# Patient Record
Sex: MALE | Race: WHITE | Employment: FULL TIME | URBAN - METROPOLITAN AREA
[De-identification: names, ages, dates, MRNs, and addresses within clinical notes are randomized per-mention and may not be internally consistent; named-entity substitution may affect disease eponyms.]

---

## 2017-05-01 ENCOUNTER — OFFICE VISIT (OUTPATIENT)
Dept: ORTHOPEDIC SURGERY | Age: 57
End: 2017-05-01

## 2017-05-01 VITALS
BODY MASS INDEX: 30.09 KG/M2 | HEART RATE: 68 BPM | HEIGHT: 73 IN | SYSTOLIC BLOOD PRESSURE: 141 MMHG | WEIGHT: 227 LBS | DIASTOLIC BLOOD PRESSURE: 85 MMHG | OXYGEN SATURATION: 99 % | RESPIRATION RATE: 20 BRPM | TEMPERATURE: 98.1 F

## 2017-05-01 DIAGNOSIS — M47.816 LUMBAR FACET ARTHROPATHY: Primary | Chronic | ICD-10-CM

## 2017-05-01 RX ORDER — TRIAMCINOLONE ACETONIDE 1 MG/G
CREAM TOPICAL
COMMUNITY
Start: 2017-04-17 | End: 2017-05-01

## 2017-05-01 RX ORDER — HYDROCODONE BITARTRATE AND ACETAMINOPHEN 5; 325 MG/1; MG/1
TABLET ORAL
Refills: 0 | COMMUNITY
Start: 2017-01-24 | End: 2017-05-01

## 2017-05-01 RX ORDER — LEVOTHYROXINE SODIUM 125 UG/1
TABLET ORAL
COMMUNITY
Start: 2017-04-24 | End: 2017-05-01

## 2017-05-01 RX ORDER — FLUTICASONE PROPIONATE 50 MCG
SPRAY, SUSPENSION (ML) NASAL
COMMUNITY
Start: 2017-04-17 | End: 2017-05-01

## 2017-05-01 NOTE — PATIENT INSTRUCTIONS
Low Back Arthritis: Exercises  Your Care Instructions  Here are some examples of typical rehabilitation exercises for your condition. Start each exercise slowly. Ease off the exercise if you start to have pain. Your doctor or physical therapist will tell you when you can start these exercises and which ones will work best for you. When you are not being active, find a comfortable position for rest. Some people are comfortable on the floor or a medium-firm bed with a small pillow under their head and another under their knees. Some people prefer to lie on their side with a pillow between their knees. Don't stay in one position for too long. Take short walks (10 to 20 minutes) every 2 to 3 hours. Avoid slopes, hills, and stairs until you feel better. Walk only distances you can manage without pain, especially leg pain. How to do the exercises  Pelvic tilt    1. Lie on your back with your knees bent. 2. \"Brace\" your stomach--tighten your muscles by pulling in and imagining your belly button moving toward your spine. 3. Press your lower back into the floor. You should feel your hips and pelvis rock back. 4. Hold for 6 seconds while breathing smoothly. 5. Relax and allow your pelvis and hips to rock forward. 6. Repeat 8 to 12 times. Back stretches    1. Get down on your hands and knees on the floor. 2. Relax your head and allow it to droop. Round your back up toward the ceiling until you feel a nice stretch in your upper, middle, and lower back. Hold this stretch for as long as it feels comfortable, or about 15 to 30 seconds. 3. Return to the starting position with a flat back while you are on your hands and knees. 4. Let your back sway by pressing your stomach toward the floor. Lift your buttocks toward the ceiling. 5. Hold this position for 15 to 30 seconds. 6. Repeat 2 to 4 times. Follow-up care is a key part of your treatment and safety.  Be sure to make and go to all appointments, and call your doctor if you are having problems. It's also a good idea to know your test results and keep a list of the medicines you take. Where can you learn more? Go to http://sarah-tomy.info/. Enter O949 in the search box to learn more about \"Low Back Arthritis: Exercises. \"  Current as of: May 23, 2016  Content Version: 11.2  © 8773-7136 [employer]. Care instructions adapted under license by [employer] (which disclaims liability or warranty for this information). If you have questions about a medical condition or this instruction, always ask your healthcare professional. [address] any warranty or liability for your use of this information.

## 2017-05-01 NOTE — LETTER
[place] [facility] Request Form for [facility] SALOME Gale Fax to [phone]  Telephone: [phone] To be completed by Physician Office: 
 
Date: 2017    Requested by: Lakshmi Alvarado Phone No: [phone]   Fax No:  [phone] Surgery Date: 17   Requested Time: 2:00 Provider: Dr. Candi Gong CPT CODE*  Procedure 46670.81 Facet Shaka L5/S1 *CPT code is required for ALL procedures. Patient Information: 
 
Name: Taylor Aguirre SSN: [id number]  : 1960   Male/Female: male Home Phone No: [phone] (home) Primary Insurance: [employer] Number: [id number] ICD10 code(s): m12.88 ICD9 code:    Diagnosis:  Lumbar Facet Arthropathy Diabetic/finger stick to be done BS level must be <200 mg/dl Anesthesia Type Local: Valium 10 mg PO 30 minutes prior to procedure 

## 2017-05-01 NOTE — PROGRESS NOTES
[facility]anda Solomon [facility] [address], [address]  [facility], [address]  Phone: [phone]  Fax: [phone]        Princess Anand  : 1960  PCP: Ginny Lerma MD    PROGRESS NOTE      ASSESSMENT AND PLAN    Roselia Perez was seen today for back pain. Diagnoses and all orders for this visit:    Lumbar facet arthropathy ([facility] [address]  -     SCHEDULE SURGERY    1. Set up for bilateral facet joint injections at L5-S1  2. Discussed RFA as possible treatment option. 3. Given home exercises. Follow-up Disposition:  Return if symptoms worsen or fail to improve. HISTORY OF PRESENT ILLNESS  Taylor Aguirre is a 60 y.o. male. Pt presents to the office for a 6 month f/u visit for back pain, lumbar facet arthropathy. He notes that during the winter he was doing well with his back pain. He notes that he did not have much pain last month when he had his initial appointment but notes that with the warm weather he has been doing more which has increased his back pain. His spouse, who is a nurse, makes sure he does his back exercises and stretches daily. He denies any trouble with prolonged standing. His pain bothers him in the morning and after prolonged activity. His back pain is most severe the day after increased activity. Stays active, plays golf. His back pain is mainly felt in his low back and occasionally into his buttocks. He describes his pain as a soreness. Pt notes that his back is stiff during the morning which will ease up as the day goes by. He denies any weakness in his BLE. No radiating pain in the office today. No saddle paresthesia. He takes PRN Mobic. Pt gets Clayton from his PCP but notes that his dose has been cut back. Denies persistent fevers, chills, weight changes, neurogenic bowel or bladder symptoms. Pt denies recent ED visits or hospitalizations. Pt planning a trip to [place] in early . Hoping to have an injection before his trip.  Believes his trip is set for June 17th. Last facet injections 2 years ago w/good benefit. Healthy otherwise. If he wants us to resume his NOrco, then would need appts at least [id number]. Pain Assessment  8/17/2016   Location of Pain Back   Location Modifiers -   Severity of Pain 4   Quality of Pain Sharp;Dull;Aching   Duration of Pain Persistent   Frequency of Pain Constant   Aggravating Factors (No Data)   Aggravating Factors Comment activity, beach house, golf   Limiting Behavior Yes   Relieving Factors Rest;Ice;Heat   Result of Injury No       PAST MEDICAL HISTORY   Past Medical History:   Diagnosis Date    Arthritis     Discogenic low back pain     L5-S1    Lumbar facet arthropathy     L4-L5    Thyroid disease        Past Surgical History:   Procedure Laterality Date    HX HEENT      deviated septum    HX ORTHOPAEDIC      rotator cuff bilateral   .      MEDICATIONS    Current Outpatient Prescriptions   Medication Sig Dispense Refill    fluticasone (FLONASE) 50 mcg/actuation nasal spray       HYDROcodone-acetaminophen (NORCO) 5-325 mg per tablet TAKE ONE TABLET BY MOUTH ONCE NIGHTLY AT BEDTIME USE SPARINGLY  0    SYNTHROID 125 mcg tablet       triamcinolone acetonide (KENALOG) 0.1 % topical cream       HYDROcodone-acetaminophen (NORCO) 7.5-325 mg per tablet Take 1 Tab by mouth two (2) times daily as needed for Pain. Max Daily Amount: 2 Tabs. 60 Tab 0    ibuprofen (MOTRIN) 600 mg tablet Take  by mouth every six (6) hours as needed for Pain.  LEVOTHYROXINE SODIUM (SYNTHROID PO) Take  by mouth.  cholecalciferol (VITAMIN D3) 1,000 unit tablet Take  by mouth daily. ALLERGIES  Allergies   Allergen Reactions    Pcn [Penicillins] Unknown (comments)     Told as a child severe allergy            SOCIAL HISTORY    Social History     Social History    Marital status: SINGLE     Spouse name: N/A    Number of children: N/A    Years of education: N/A     Occupational History    Not on file.      Social History Main Topics    Smoking status: Never Smoker    Smokeless tobacco: Former User    Alcohol use Yes      Comment: Occasional beer consumption    Drug use: Not on file    Sexual activity: Not on file     Other Topics Concern    Not on file     Social History Narrative       FAMILY HISTORY  Family History   Problem Relation Age of Onset    Diabetes Sister        REVIEW OF SYSTEMS  Review of Systems   Constitutional: Negative for chills, fever and weight loss. Respiratory: Negative for shortness of breath. Cardiovascular: Negative for chest pain. Gastrointestinal: Negative for constipation. Negative for fecal incontinence   Genitourinary: Negative for dysuria. Negative for urinary incontinence   Musculoskeletal:        Per HPI   Skin: Negative for rash. Neurological: Negative for dizziness, tingling, tremors, focal weakness and headaches. Endo/Heme/Allergies: Does not bruise/bleed easily. Psychiatric/Behavioral: The patient does not have insomnia. PHYSICAL EXAMINATION  Visit Vitals    /85    Pulse 68    Temp 98.1 °F (36.7 °C) (Oral)    Resp 20    Ht 6' 1\" (1.854 m)    Wt 227 lb (103 kg)    SpO2 99%    BMI 29.95 kg/m2         Accompanied by self. Constitutional:  Well developed, well nourished, in no acute distress. Psychiatric: Affect and mood are appropriate. Integumentary: No rashes or abrasions noted on exposed areas. Cardiovascular/Peripheral Vascular: Intact l pulses. No peripheral edema is noted. Lymphatic:  No evidence of lymphedema. No cervical lymphadenopathy. SPINE/MUSCULOSKELETAL EXAM    Lumbar spine:  No rash, ecchymosis, or gross obliquity. No fasciculations. No focal atrophy is noted. Lumbar pain with extension. Tenderness to palpation of L4-5. No tenderness to palpation at the sciatic notch. SI joints non-tender. Trochanters non tender. Sensation grossly intact to light touch.       MOTOR:       Hip Flex  Quads Hamstrings Ankle DF EHL Ankle PF   Right +4/5 +4/5 +4/5 +4/5 +4/5 +4/5   Left +4/5 +4/5 +4/5 +4/5 +4/5 +4/5     Straight Leg raise negative. No difficulty with tandem gait. Heel walk intact. Ambulation without assistive device. FWB. No hyper reflexia. Written by Ganesh Collins, as dictated by Janene Primrose, MD.    I, Dr. Janene Primrose, MD, confirm that all documentation is accurate. Mr. Aidan Ferguson may have a reminder for a \"due or due soon\" health maintenance. I have asked that he contact his primary care provider for follow-up on this health maintenance.

## 2017-05-09 ENCOUNTER — APPOINTMENT (OUTPATIENT)
Dept: GENERAL RADIOLOGY | Age: 57
End: 2017-05-09
Attending: PHYSICAL MEDICINE & REHABILITATION
Payer: COMMERCIAL

## 2017-05-09 ENCOUNTER — HOSPITAL ENCOUNTER (OUTPATIENT)
Age: 57
Setting detail: OUTPATIENT SURGERY
Discharge: HOME | End: 2017-05-09
Attending: PHYSICAL MEDICINE & REHABILITATION | Admitting: PHYSICAL MEDICINE & REHABILITATION
Payer: COMMERCIAL

## 2017-05-09 VITALS
SYSTOLIC BLOOD PRESSURE: 126 MMHG | HEART RATE: 58 BPM | OXYGEN SATURATION: 99 % | DIASTOLIC BLOOD PRESSURE: 79 MMHG | RESPIRATION RATE: 18 BRPM | BODY MASS INDEX: 29.69 KG/M2 | TEMPERATURE: 98.2 F | WEIGHT: 225 LBS

## 2017-05-09 PROCEDURE — 74011250636 HC RX REV CODE- 250/636

## 2017-05-09 PROCEDURE — 74011000250 HC RX REV CODE- 250

## 2017-05-09 PROCEDURE — 74011636320 HC RX REV CODE- 636/320

## 2017-05-09 PROCEDURE — 76010000009 HC PAIN MGT 0 TO 30 MIN PROC: Performed by: PHYSICAL MEDICINE & REHABILITATION

## 2017-05-09 PROCEDURE — 74011250637 HC RX REV CODE- 250/637: Performed by: PHYSICAL MEDICINE & REHABILITATION

## 2017-05-09 RX ORDER — LIDOCAINE HYDROCHLORIDE 10 MG/ML
INJECTION, SOLUTION EPIDURAL; INFILTRATION; INTRACAUDAL; PERINEURAL AS NEEDED
Status: DISCONTINUED | OUTPATIENT
Start: 2017-05-09 | End: 2017-05-09 | Stop reason: HOSPADM

## 2017-05-09 RX ORDER — DEXAMETHASONE SODIUM PHOSPHATE 100 MG/10ML
INJECTION INTRAMUSCULAR; INTRAVENOUS AS NEEDED
Status: DISCONTINUED | OUTPATIENT
Start: 2017-05-09 | End: 2017-05-09 | Stop reason: HOSPADM

## 2017-05-09 RX ORDER — DIAZEPAM 5 MG/1
2.5-1 TABLET ORAL ONCE
Status: COMPLETED | OUTPATIENT
Start: 2017-05-09 | End: 2017-05-09

## 2017-05-09 RX ADMIN — DIAZEPAM 10 MG: 5 TABLET ORAL at 13:11

## 2017-05-09 NOTE — H&P (VIEW-ONLY)
[facility] [address], [address]  [place], [address] [place] [address] Street  Phone: [phone]  Fax: [phone]        Claritza Ferro  : 1960  PCP: Kaylene Goodrich MD    PROGRESS NOTE      ASSESSMENT AND PLAN    Manan Morgan was seen today for back pain. Diagnoses and all orders for this visit:    Lumbar facet arthropathy ([facility] [address]  -     SCHEDULE SURGERY    1. Set up for bilateral facet joint injections at L5-S1  2. Discussed RFA as possible treatment option. 3. Given home exercises. Follow-up Disposition:  Return if symptoms worsen or fail to improve. HISTORY OF PRESENT ILLNESS  Clearance Kymberly is a 60 y.o. male. Pt presents to the office for a 6 month f/u visit for back pain, lumbar facet arthropathy. He notes that during the winter he was doing well with his back pain. He notes that he did not have much pain last month when he had his initial appointment but notes that with the warm weather he has been doing more which has increased his back pain. His spouse, who is a nurse, makes sure he does his back exercises and stretches daily. He denies any trouble with prolonged standing. His pain bothers him in the morning and after prolonged activity. His back pain is most severe the day after increased activity. Stays active, plays golf. His back pain is mainly felt in his low back and occasionally into his buttocks. He describes his pain as a soreness. Pt notes that his back is stiff during the morning which will ease up as the day goes by. He denies any weakness in his BLE. No radiating pain in the office today. No saddle paresthesia. He takes PRN Mobic. Pt gets Carolina from his PCP but notes that his dose has been cut back. Denies persistent fevers, chills, weight changes, neurogenic bowel or bladder symptoms. Pt denies recent ED visits or hospitalizations. Pt planning a trip to [place] in early . Hoping to have an injection before his trip.  Believes his trip is set for June 17th. Last facet injections 2 years ago w/good benefit. Healthy otherwise. If he wants us to resume his NOrco, then would need appts at least [id number]. Pain Assessment  8/17/2016   Location of Pain Back   Location Modifiers -   Severity of Pain 4   Quality of Pain Sharp;Dull;Aching   Duration of Pain Persistent   Frequency of Pain Constant   Aggravating Factors (No Data)   Aggravating Factors Comment activity, beach house, golf   Limiting Behavior Yes   Relieving Factors Rest;Ice;Heat   Result of Injury No       PAST MEDICAL HISTORY   Past Medical History:   Diagnosis Date    Arthritis     Discogenic low back pain     L5-S1    Lumbar facet arthropathy     L4-L5    Thyroid disease        Past Surgical History:   Procedure Laterality Date    HX HEENT      deviated septum    HX ORTHOPAEDIC      rotator cuff bilateral   .      MEDICATIONS    Current Outpatient Prescriptions   Medication Sig Dispense Refill    fluticasone (FLONASE) 50 mcg/actuation nasal spray       HYDROcodone-acetaminophen (NORCO) 5-325 mg per tablet TAKE ONE TABLET BY MOUTH ONCE NIGHTLY AT BEDTIME USE SPARINGLY  0    SYNTHROID 125 mcg tablet       triamcinolone acetonide (KENALOG) 0.1 % topical cream       HYDROcodone-acetaminophen (NORCO) 7.5-325 mg per tablet Take 1 Tab by mouth two (2) times daily as needed for Pain. Max Daily Amount: 2 Tabs. 60 Tab 0    ibuprofen (MOTRIN) 600 mg tablet Take  by mouth every six (6) hours as needed for Pain.  LEVOTHYROXINE SODIUM (SYNTHROID PO) Take  by mouth.  cholecalciferol (VITAMIN D3) 1,000 unit tablet Take  by mouth daily. ALLERGIES  Allergies   Allergen Reactions    Pcn [Penicillins] Unknown (comments)     Told as a child severe allergy            SOCIAL HISTORY    Social History     Social History    Marital status: SINGLE     Spouse name: N/A    Number of children: N/A    Years of education: N/A     Occupational History    Not on file.      Social History Main Topics    Smoking status: Never Smoker    Smokeless tobacco: Former User    Alcohol use Yes      Comment: Occasional beer consumption    Drug use: Not on file    Sexual activity: Not on file     Other Topics Concern    Not on file     Social History Narrative       FAMILY HISTORY  Family History   Problem Relation Age of Onset    Diabetes Sister        REVIEW OF SYSTEMS  Review of Systems   Constitutional: Negative for chills, fever and weight loss. Respiratory: Negative for shortness of breath. Cardiovascular: Negative for chest pain. Gastrointestinal: Negative for constipation. Negative for fecal incontinence   Genitourinary: Negative for dysuria. Negative for urinary incontinence   Musculoskeletal:        Per HPI   Skin: Negative for rash. Neurological: Negative for dizziness, tingling, tremors, focal weakness and headaches. Endo/Heme/Allergies: Does not bruise/bleed easily. Psychiatric/Behavioral: The patient does not have insomnia. PHYSICAL EXAMINATION  Visit Vitals    /85    Pulse 68    Temp 98.1 °F (36.7 °C) (Oral)    Resp 20    Ht 6' 1\" (1.854 m)    Wt 227 lb (103 kg)    SpO2 99%    BMI 29.95 kg/m2         Accompanied by self. Constitutional:  Well developed, well nourished, in no acute distress. Psychiatric: Affect and mood are appropriate. Integumentary: No rashes or abrasions noted on exposed areas. Cardiovascular/Peripheral Vascular: Intact l pulses. No peripheral edema is noted. Lymphatic:  No evidence of lymphedema. No cervical lymphadenopathy. SPINE/MUSCULOSKELETAL EXAM    Lumbar spine:  No rash, ecchymosis, or gross obliquity. No fasciculations. No focal atrophy is noted. Lumbar pain with extension. Tenderness to palpation of L4-5. No tenderness to palpation at the sciatic notch. SI joints non-tender. Trochanters non tender. Sensation grossly intact to light touch.       MOTOR:       Hip Flex  Quads Hamstrings Ankle DF EHL Ankle PF   Right +4/5 +4/5 +4/5 +4/5 +4/5 +4/5   Left +4/5 +4/5 +4/5 +4/5 +4/5 +4/5     Straight Leg raise negative. No difficulty with tandem gait. Heel walk intact. Ambulation without assistive device. FWB. No hyper reflexia. Written by Nicho Murcia, as dictated by Frandy Fermin MD.    I, Dr. Frandy Fermin MD, confirm that all documentation is accurate. Mr. Stephani Oreilly may have a reminder for a \"due or due soon\" health maintenance. I have asked that he contact his primary care provider for follow-up on this health maintenance.

## 2017-05-09 NOTE — DISCHARGE INSTRUCTIONS
[facility]   ([place])  Dr. Judeth Boas, Dr. Ally Molina, Dr. Barth Dear not drive a car, operate heavy machinery or dangerous equipment for 24 hours. * Activity as tolerated; rest for the remainder of the day. * Resume pre-block medications including those for your family doctor. * Do not drink alcoholic beverages for 24 hours. Alcohol and the medications you have received may interact and cause an adverse reaction. * You may feel better this evening and worse tomorrow, as the numbing medications wears off and the steroid has yet to begin to work. After 48 hrs the steroid should begin to release bringing you relief. * You may shower this evening and remove any bandages. * Avoid hot tubs and heating pads for 24 hours. You may use cold packs on the procedure site as tolerated for the first 24 hours. * If a headache develops, drink plenty of fluids and rest.  Take over the counter medications for headache if needed. If the headache continues longer than 24 hours, call MD at the [employer] [phone]    * Continue taking pain medications as needed. * You may resume your regular diet if tolerated. Otherwise, start with sips of water and advance slowly. * If Diabetic: check your blood sugar three times a day for the next 3 days. If your sugar is greater than 300 call your family doctor. If your sugar is greater than 400, have someone transport you to the nearest Emergency Room. * If you experience any of the following problems, Please Call the [employer] at [phone].         * Shortness of Breath    * Fever of 101 or higher    * Nausea / Vomiting    * Severe Headache    * Weakness or numbness in arms or legs that is not      resolving    * Prolonged increase in pain greater than 4 days      DISCHARGE SUMMARY from Nurse      PATIENT INSTRUCTIONS:    After oral sedation, for 24 hours or while taking prescription Narcotics:  · Limit your activities  · Do not drive and operate hazardous machinery  · Do not make important personal or business decisions  · Do  not drink alcoholic beverages  · If you have not urinated within 8 hours after discharge, please contact your surgeon on call. Report the following to your surgeon:  · Excessive pain, swelling, redness or odor of or around the surgical area  · Temperature over 101  · Nausea and vomiting lasting longer than 4 hours or if unable to take medications  · Any signs of decreased circulation or nerve impairment to extremity: change in color, persistent  numbness, tingling, coldness or increase pain  · Any questions            What to do at Home:  Recommended activity: Activity as tolerated, NO DRIVING FOR 12 Hours post injection          *  Please give a list of your current medications to your Primary Care Provider. *  Please update this list whenever your medications are discontinued, doses are      changed, or new medications (including over-the-counter products) are added. *  Please carry medication information at all times in case of emergency situations. These are general instructions for a healthy lifestyle:    No smoking/ No tobacco products/ Avoid exposure to second hand smoke    Surgeon General's Warning:  Quitting smoking now greatly reduces serious risk to your health. Obesity, smoking, and sedentary lifestyle greatly increases your risk for illness    A healthy diet, regular physical exercise & weight monitoring are important for maintaining a healthy lifestyle    You may be retaining fluid if you have a history of heart failure or if you experience any of the following symptoms:  Weight gain of 3 pounds or more overnight or 5 pounds in a week, increased swelling in our hands or feet or shortness of breath while lying flat in bed.   Please call your doctor as soon as you notice any of these symptoms; do not wait until your next office visit. Recognize signs and symptoms of STROKE:    F-face looks uneven    A-arms unable to move or move unevenly    S-speech slurred or non-existent    T-time-call 911 as soon as signs and symptoms begin-DO NOT go       Back to bed or wait to see if you get better-TIME IS BRAIN.

## 2017-05-09 NOTE — PROCEDURES
Facet Joint Block Procedure Note    Patient Name: Rosy Rossi    Date of Procedure: May 9, 2017    Preoperative Diagnosis: Arthropathy of lumbar facet joint [facility]) [M12.88]    Post Operative Diagnosis:[facility]thropathy [facility] lumbar facet joint [facility] [M12.88]     Procedure:  bilateral  L5-S1 Facet Joint Block        Consent: Informed consent was obtained prior to the procedure. The patient was given the opportunity to ask questions regarding the procedure and its associated risks. In addition to the potential risks associated with the procedure itself, the patient was informed both verbally and in writing of potential side effects of the use of glucocorticoids. The patient appeared to comprehend the informed consent and desired to have the procedure performed. Procedure: The patient was placed in the prone position on the fluoroscopy table and the back was prepped and draped in the usual sterile manner. At each blocked level, the exact location of the facet joint was identified with flouroscopy, and after local Lidocaine 1% injection, a #22 gauge spinal needle was then advanced toward the joint. Yes a small amount of Isovue was used to confirm placement, no vascular uptake was identified. A total of 30 mg of preservative free Dexamethasone and 5 cc of 1% Lidocaine was injected in and around all of the sites. The patient tolerated the procedure well. The injection area was cleaned and bandaids applied. No excessive bleeding was noted. Patient dressed and was discharged to home with instructions.        Juan Easley MD  May 9, 2017

## 2017-05-09 NOTE — INTERVAL H&P NOTE
H&P Update:  Eduar Moreau was seen and briefly examined. History and physical has been reviewed.  There have been no significant clinical changes since the completion of the originally dated History and Physical.    Signed By: Brian Green MD     May 9, 2017 1:53 PM

## 2018-11-26 ENCOUNTER — HOSPITAL ENCOUNTER (OUTPATIENT)
Dept: GENERAL RADIOLOGY | Age: 58
Discharge: HOME | End: 2018-11-26
Payer: COMMERCIAL

## 2018-11-26 DIAGNOSIS — R55 SYNCOPAL EPISODES: ICD-10-CM

## 2018-11-26 PROCEDURE — 71046 X-RAY EXAM CHEST 2 VIEWS: CPT

## 2021-11-29 ENCOUNTER — OFFICE VISIT (OUTPATIENT)
Dept: ORTHOPEDIC SURGERY | Age: 61
End: 2021-11-29
Payer: COMMERCIAL

## 2021-11-29 VITALS
HEART RATE: 69 BPM | OXYGEN SATURATION: 98 % | TEMPERATURE: 97.6 F | HEIGHT: 73 IN | BODY MASS INDEX: 29.29 KG/M2 | WEIGHT: 221 LBS

## 2021-11-29 DIAGNOSIS — D17.79 EPIDURAL LIPOMATOSIS: ICD-10-CM

## 2021-11-29 DIAGNOSIS — M54.50 CHRONIC LOW BACK PAIN, UNSPECIFIED BACK PAIN LATERALITY, UNSPECIFIED WHETHER SCIATICA PRESENT: ICD-10-CM

## 2021-11-29 DIAGNOSIS — G89.29 CHRONIC LOW BACK PAIN, UNSPECIFIED BACK PAIN LATERALITY, UNSPECIFIED WHETHER SCIATICA PRESENT: ICD-10-CM

## 2021-11-29 DIAGNOSIS — M47.816 LUMBAR SPONDYLOSIS: Primary | ICD-10-CM

## 2021-11-29 PROCEDURE — 72110 X-RAY EXAM L-2 SPINE 4/>VWS: CPT | Performed by: PHYSICAL MEDICINE & REHABILITATION

## 2021-11-29 PROCEDURE — 99204 OFFICE O/P NEW MOD 45 MIN: CPT | Performed by: PHYSICAL MEDICINE & REHABILITATION

## 2021-11-29 RX ORDER — DULOXETIN HYDROCHLORIDE 20 MG/1
20 CAPSULE, DELAYED RELEASE ORAL DAILY
Qty: 90 CAPSULE | Refills: 0 | Status: SHIPPED | OUTPATIENT
Start: 2021-11-29 | End: 2022-02-02 | Stop reason: SINTOL

## 2021-11-29 RX ORDER — TRIAMCINOLONE ACETONIDE 1 MG/G
CREAM TOPICAL
COMMUNITY
Start: 2021-09-20

## 2021-11-29 RX ORDER — FLUTICASONE PROPIONATE 50 MCG
2 SPRAY, SUSPENSION (ML) NASAL
COMMUNITY
Start: 2021-09-18

## 2021-11-29 NOTE — PROGRESS NOTES
[address], [address]  [place], [facility]  Phone: [phone]  Fax: [phone]        Ritu Piedra  : 1960  PCP: Negra Lund MD    NEW PATIENT EVALUATION      ASSESSMENT AND PLAN    Diagnoses and all orders for this visit:    1. Lumbar spondylosis  -     DULoxetine (CYMBALTA) 20 mg capsule; Take 1 Capsule by mouth daily. Take w/food    2. Chronic low back pain, unspecified back pain laterality, unspecified whether sciatica present  -     AMB POC XRAY, SPINE, LUMBOSACRAL; 4+    3. Epidural lipomatosis         1. Kori العراقي is a 59 y.o. male with chronic axial greater than stenotic symptoms. Discussed options  2. Risks, benefits, alternatives, and limitations of RFA discussed with patient. 3. Trial of Cymbalta 20 mg daily with food  4. Patient may take up to 2g (2,000 mg) of Tylenol every 24 hours for routine use, or 3g (3,000 mg) for short-term use. 5. Given information on lumbar exercises      Follow-up and Dispositions    · Return in about 6 weeks (around 1/10/2022). HISTORY OF PRESENT ILLNESS  Kori العراقي is seen today in consultation for back pain x years. Last seen . Had facet joint injections at that time with benefit. He returns today at the request of Dr. Aaliyah Rojas, PCP. She is trying to wean his Norco.  Renal dysfunction noted, he has been taken off of NSAIDs with normalization of renal function. He reports a constant back pain that is worse in the morning. Denies sciatic pain. His pain is exacerbated with bending and twisting. Denies numbness or tingling. Denies insomnia. If he overdoes it, he feels pain in his buttocks. He is taking a half tab of Hydrocodone 2-3 times a week and Ibuprofen PRN with benefit. He states that he limits the amount of Ibuprofen he uses. Denies red flags including persistent fevers, chills, weight changes, neurogenic bowel or bladder symptoms.       Pain Assessment  11/29/2021   Location of Pain Back   Location Modifiers -   Severity of Pain 3   Quality of Pain Aching   Duration of Pain Persistent   Frequency of Pain Constant   Aggravating Factors Other (Comment)   Aggravating Factors Comment move a certain way/different positions, getting up in the morning, rake leaves, mow lawn   Limiting Behavior Yes   Relieving Factors Ice   Result of Injury No       Onset of pain: years, no injury    Does patient have numbness/tingling in extremities: no    Denies persistent fevers, chills, weight changes, saddle paresthesias, and neurogenic bowel or bladder symptoms. 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 PHYSICAL EXAM  Lumbar flexion fingertips to knees, pain with extension  TTP L4-5  Intact tandem gait  Lower extremity strength intact  Straight leg raise negative    Visit Vitals  Pulse 69   Temp 97.6 °F (36.4 °C) (Tympanic)   Ht 6' 1\" (1.854 m)   Wt 221 lb (100.2 kg)   SpO2 98% Comment: RA   BMI 29.16 kg/m²         Past Medical History:   Diagnosis Date    Arthritis     Discogenic low back pain     L5-S1    Lumbar facet arthropathy     L4-L5    Thyroid disease         Past Surgical History:   Procedure Laterality Date    HX HEENT      deviated septum    HX ORTHOPAEDIC      rotator cuff bilateral         Current Outpatient Medications   Medication Sig Dispense Refill    fluticasone propionate (FLONASE) 50 mcg/actuation nasal spray 2 Sprays by Both Nostrils route daily as needed.       triamcinolone acetonide (KENALOG) 0.1 % topical cream Apply  to affected area daily as needed.  DULoxetine (CYMBALTA) 20 mg capsule Take 1 Capsule by mouth daily. Take w/food 90 Capsule 0    LEVOTHYROXINE SODIUM (SYNTHROID PO) Take 25 mcg by mouth daily.  cholecalciferol (VITAMIN D3) 1,000 unit tablet Take  by mouth daily.  HYDROcodone-acetaminophen (NORCO) 7.5-325 mg per tablet Take 1 Tab by mouth two (2) times daily as needed for Pain. Max Daily Amount: 2 Tabs.  60 Tab 0

## 2021-11-29 NOTE — PROGRESS NOTES
Gunner Molina presents today for   Chief Complaint   Patient presents with    Back Pain       Is someone accompanying this pt? no    Is the patient using any DME equipment during OV? no      Coordination of Care:  1. Have you been to the ER, urgent care clinic since your last visit? no  Hospitalized since your last visit? no    2. Have you seen or consulted any other health care providers outside of the [address] [place] [address] since your last visit? Yes, pcp Include any pap smears or colon screening.  no

## 2021-11-29 NOTE — LETTER
11/29/2021    Patient: Mushtaq Sneed   YOB: 1960   Date of Visit: 11/29/2021     Boo Pacheco MD  [address] [address]  Via Fax: [phone]    Dear Boo Pacheco MD,      Thank you for referring Mr. Shani Cardoso to [facility] for evaluation. My notes for this consultation are attached. If you have questions, please do not hesitate to call me. I look forward to following your patient along with you.       Sincerely,    Jim Desai MD

## 2021-11-29 NOTE — PATIENT INSTRUCTIONS

## 2022-02-02 ENCOUNTER — OFFICE VISIT (OUTPATIENT)
Dept: ORTHOPEDIC SURGERY | Age: 62
End: 2022-02-02
Payer: COMMERCIAL

## 2022-02-02 VITALS
HEART RATE: 76 BPM | WEIGHT: 223 LBS | HEIGHT: 73 IN | OXYGEN SATURATION: 98 % | TEMPERATURE: 96.8 F | SYSTOLIC BLOOD PRESSURE: 143 MMHG | BODY MASS INDEX: 29.55 KG/M2 | DIASTOLIC BLOOD PRESSURE: 82 MMHG

## 2022-02-02 DIAGNOSIS — M47.816 LUMBAR SPONDYLOSIS: ICD-10-CM

## 2022-02-02 DIAGNOSIS — M47.816 LUMBAR SPONDYLOSIS: Primary | ICD-10-CM

## 2022-02-02 PROCEDURE — 20552 NJX 1/MLT TRIGGER POINT 1/2: CPT | Performed by: PHYSICAL MEDICINE & REHABILITATION

## 2022-02-02 RX ORDER — ACETAMINOPHEN 325 MG/1
325 TABLET ORAL
COMMUNITY
End: 2022-05-12

## 2022-02-02 RX ORDER — LIDOCAINE HYDROCHLORIDE 20 MG/ML
0.5 INJECTION, SOLUTION INFILTRATION; PERINEURAL ONCE
Status: COMPLETED | OUTPATIENT
Start: 2022-02-02 | End: 2022-02-02

## 2022-02-02 RX ORDER — BETAMETHASONE SODIUM PHOSPHATE AND BETAMETHASONE ACETATE 3; 3 MG/ML; MG/ML
12 INJECTION, SUSPENSION INTRA-ARTICULAR; INTRALESIONAL; INTRAMUSCULAR; SOFT TISSUE ONCE
Status: COMPLETED | OUTPATIENT
Start: 2022-02-02 | End: 2022-02-02

## 2022-02-02 RX ORDER — BUPIVACAINE HYDROCHLORIDE 2.5 MG/ML
0.5 INJECTION, SOLUTION INFILTRATION; PERINEURAL ONCE
Status: COMPLETED | OUTPATIENT
Start: 2022-02-02 | End: 2022-02-02

## 2022-02-02 RX ADMIN — BUPIVACAINE HYDROCHLORIDE 1.25 MG: 2.5 INJECTION, SOLUTION INFILTRATION; PERINEURAL at 15:00

## 2022-02-02 RX ADMIN — BETAMETHASONE SODIUM PHOSPHATE AND BETAMETHASONE ACETATE 12 MG: 3; 3 INJECTION, SUSPENSION INTRA-ARTICULAR; INTRALESIONAL; INTRAMUSCULAR; SOFT TISSUE at 15:00

## 2022-02-02 RX ADMIN — LIDOCAINE HYDROCHLORIDE 10 MG: 20 INJECTION, SOLUTION INFILTRATION; PERINEURAL at 15:00

## 2022-02-02 NOTE — PROGRESS NOTES
Joycesaad Wells presents today for   Chief Complaint   Patient presents with    Back Pain       Is someone accompanying this pt? no    Is the patient using any DME equipment during OV? no    Coordination of Care:  1. Have you been to the ER, urgent care clinic since your last visit? no  Hospitalized since your last visit? no    2. Have you seen or consulted any other health care providers outside of the [address] [place] [address] since your last visit? no Include any pap smears or colon screening.  no

## 2022-02-02 NOTE — LETTER
2/2/2022    Patient: Letitia Mondragon   YOB: 1960   Date of Visit: 2/2/2022     Diane Johnson MD  [address] [address]  Via Fax: [phone]    Dear Diane Johnson MD,      Thank you for referring Mr. Tita Pacheco to [address] [place] [address] [facility] for evaluation. My notes for this consultation are attached. If you have questions, please do not hesitate to call me. I look forward to following your patient along with you.       Sincerely,    Roseanne David MD

## 2022-02-02 NOTE — PROGRESS NOTES
[address], [address]  [facility], [address]  Phone: [phone]  Fax: [phone]        [facility]  : 1960  PCP: Gurdeep Self MD    PROGRESS NOTE      ASSESSMENT AND PLAN    Diagnoses and all orders for this visit:    1. Lumbar spondylosis  -     INJECT TRIGGER POINT, 1 OR 2  -     lidocaine (XYLOCAINE) 20 mg/mL (2 %) injection 10 mg  -     bupivacaine HCl (MARCAINE) 0.25 % (2.5 mg/mL) injection 1.25 mg  -     betamethasone (CELESTONE) injection 12 mg  -     MRI LUMB SPINE WO CONT; Future        1. 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 PCP weaning him off of opioids. Intolerant of duloxetine. Discussed trials of other medications. He would like to have facet joint injections repeated. His last MRI was in , he needs a more recent study. 2. DC Cymbalta  3. Trigger point injections B/L iliolumbar as a temporizing measure. 4. L MRI for 6 months increasing low back pain radiating into buttocks, failed HEP, possible injections, unable to take NSAIDs          HISTORY OF PRESENT ILLNESS      Tati Coronado is a 59 y.o. male presents for follow up of back pain. LV, trial of Cymbalta 20 mg. He reports constant back pain that radiates into his buttocks. His pain is exacerbated with bending and twisting. Patient states that his symptoms are worse in the morning. Denies sciatica. Patient suffered from mood swings and constipation while taking Cymbalta. He stopped taking this medication.     Pain Assessment  2022   Location of Pain Back   Location Modifiers -   Severity of Pain 3   Quality of Pain Aching   Duration of Pain Persistent   Frequency of Pain Constant   Aggravating Factors Other (Comment)   Aggravating Factors Comment different positions, movements, worse in the morning   Limiting Behavior Some   Relieving Factors Other (Comment)   Relieving Factors Comment pain pills   Result of Injury No         Investigations:   L XR AP/lat/flex/ext 4V 11/29/2021 (I personally reviewed these images): facet changes L3 to sacrum, no instability  L MRI 2015: disc protrusion with FA L4/5/S1, mild stenosis  Spine surgery consult: no     Treatments:  Physical therapy: no  Spinal injections: B/L L5-S1 facet injections 2017 with benefit  Spinal surgery- no  Beneficial medications: Ibuprofen, Norco (PCP weaning)  Failed medications: Cymbalta - mood swings     Work Status: shipyard as a matt  Pertinent PMHx:  Thyroid disease, rotator cuff repair B/L. PHYSICAL EXAMINATION    Visit Vitals  BP (!) 143/82 (BP 1 Location: Left upper arm, BP Patient Position: Sitting, BP Cuff Size: Adult) Comment: pt asymptomatic, MD aware   Pulse 76   Temp 96.8 °F (36 °C) (Temporal)   Ht 6' 1\" (1.854 m)   Wt 223 lb (101.2 kg)   SpO2 98% Comment: RA   BMI 29.42 kg/m²       TTP B/L L4-5, L5-S1, B/L sciatic notch  Pain with facet loading  Intact tandem gait  Intact heel walk   LE strength intact  SLR negative      [facility]  OFFICE PROCEDURE PROGRESS NOTE      PROCEDURE: In the office today after informed consent using aseptic technique, the patient was injected with a total of 2 cc of 6 mg/cc Celestone, 0.5 cc each of Lidocaine and Marcaine into his B/L iliolumbar trigger point. Chart reviewed for the following:   Dr. Hitesh REID Client, have reviewed the History, Physical and updated the Allergic reactions for [facility]. Local measures (ice/heat) and medications have not alleviated the symptoms. TIME OUT performed immediately prior to start of procedure:   Dr. Hitesh REID Client, have performed the following reviews on [facility] prior to the start of the procedure:       Patient denies any recent fevers, chills, antibiotics, recent cortisone injections, or infections.         * Patient was identified by name and date of birth   * Agreement on procedure being performed was verified  * Risks and Benefits explained to the patient  * Procedure site verified and marked as necessary  * Patient was positioned for comfort  * Consent was signed and verified     Time: 3:03 PM    Date of procedure: 2/2/2022    Procedure performed by:  Yakelin Li MD    Provider assisted by: None    Patient assisted by: Self    How tolerated by patient: Pt tolerated the procedure well with no complications.                      Written by Sunday Line, as dictated by Robles Puri MD. 4

## 2022-02-16 ENCOUNTER — HOSPITAL ENCOUNTER (OUTPATIENT)
Age: 62
Discharge: HOME | End: 2022-02-16
Attending: PHYSICAL MEDICINE & REHABILITATION
Payer: COMMERCIAL

## 2022-02-16 PROCEDURE — 72148 MRI LUMBAR SPINE W/O DYE: CPT

## 2022-03-17 ENCOUNTER — OFFICE VISIT (OUTPATIENT)
Dept: ORTHOPEDIC SURGERY | Age: 62
End: 2022-03-17
Payer: COMMERCIAL

## 2022-03-17 VITALS
TEMPERATURE: 96.9 F | DIASTOLIC BLOOD PRESSURE: 80 MMHG | HEIGHT: 73 IN | BODY MASS INDEX: 29.55 KG/M2 | SYSTOLIC BLOOD PRESSURE: 139 MMHG | WEIGHT: 223 LBS | HEART RATE: 70 BPM | OXYGEN SATURATION: 98 %

## 2022-03-17 DIAGNOSIS — D17.79 EPIDURAL LIPOMATOSIS: Primary | ICD-10-CM

## 2022-03-17 DIAGNOSIS — M47.816 LUMBAR FACET ARTHROPATHY: ICD-10-CM

## 2022-03-17 PROCEDURE — 99214 OFFICE O/P EST MOD 30 MIN: CPT | Performed by: PHYSICAL MEDICINE & REHABILITATION

## 2022-03-17 RX ORDER — PREGABALIN 50 MG/1
50 CAPSULE ORAL 2 TIMES DAILY
Qty: 180 CAPSULE | Refills: 0 | Status: ON HOLD | OUTPATIENT
Start: 2022-03-17 | End: 2022-03-29

## 2022-03-17 NOTE — H&P (VIEW-ONLY)
Lawanda Solomon [facility] [address]  [address], [address]  [place], [address] [place] [address] Street  Phone: [phone]  Fax: [phone]        Keaton Piedra  : 1960  PCP: Severiano Deaner, MD    PROGRESS NOTE      ASSESSMENT AND PLAN    Diagnoses and all orders for this visit:    1. Epidural lipomatosis  -     pregabalin (Lyrica) 50 mg capsule; Take 1 Capsule by mouth two (2) times a day. Max Daily Amount: 100 mg. Month 1: one po qhs x 1 week, then increase to one po bid thereafter  -     SCHEDULE SURGERY    2. Lumbar facet arthropathy  -     REFERRAL TO PAIN MANAGEMENT  -     pregabalin (Lyrica) 50 mg capsule; Take 1 Capsule by mouth two (2) times a day. Max Daily Amount: 100 mg. Month 1: one po qhs x 1 week, then increase to one po bid thereafter        1. 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 2. Referral to pain management, pt was on chronic Norco through prior PCP who has retired. 3. Risks, benefits, alternatives, and limitations of CECE discussed with patient. Patient wishes to proceed. 4. Schedule CECE L5-S1  5. Trial of Lyrica 50 mg BID  6. Discussed indications for RFA and sx    Follow-up and Dispositions    · Return in about 5 weeks (around 2022) for after Injections. HISTORY OF PRESENT ILLNESS      Vicente Mills is a 59 y.o. male presents for follow up of back pain/MRI. He continues to have constant back pain. Radiates into buttocks. His pain is exacerbated with standing and bending. He notes an increase in pain after playing with his grandchildren. He states the trigger point injections last visit provided no significant benefit. New PCP will not write for any more [place] (unlike prior PCP who retired).     Pain Assessment  3/17/2022   Location of Pain Back   Location Modifiers -   Severity of Pain 3   Quality of Pain Aching   Duration of Pain Persistent   Frequency of Pain Constant   Aggravating Factors Other (Comment)   Aggravating Factors Comment activities   Limiting Behavior Yes   Relieving Factors Other (Comment)   Relieving Factors Comment a little bit of everything   Result of Injury No         Investigations:   L MRI 2/2022: progression of DDD, epidural lipomatosis. Mild stenosis L2/3/4/5, moderate FA with inflammation. My read: worst level stenosis L3-4  L XR AP/lat/flex/ext 4V 11/29/2021 (I personally reviewed these images): facet changes L3 to sacrum, no instability  L MRI 2015: disc protrusion with FA L4/5/S1, mild stenosis  Spine surgery consult: no     Treatments:  Physical therapy: no  Spinal injections: B/L L5-S1 facet injections 2017 with benefit  Spinal surgery- no  Beneficial medications: Ibuprofen, Norco (PCP weaning)  Failed medications: Cymbalta - mood swings     Work Status: shipyard as a matt  Pertinent PMHx:  Thyroid disease, rotator cuff repair B/L.        PHYSICAL EXAMINATION    Visit Vitals  /80 (BP 1 Location: Left upper arm, BP Patient Position: Sitting, BP Cuff Size: Adult)   Pulse 70   Temp 96.9 °F (36.1 °C) (Temporal)   Ht 6' 1\" (1.854 m)   Wt 223 lb (101.2 kg)   SpO2 98%   BMI 29.42 kg/m²       Lumbar flexion to knees.  Pain with facet loading  Intact tandem gait and heel walk  SLR negative  LE motor intact              Written by Faith Fan, as dictated by Lois Brumfield MD.

## 2022-03-17 NOTE — PROGRESS NOTES
Stalin Torre presents today for   Chief Complaint   Patient presents with    Back Pain       Is someone accompanying this pt? no    Is the patient using any DME equipment during OV? no      Coordination of Care:  1. Have you been to the ER, urgent care clinic since your last visit? no  Hospitalized since your last visit? no    2. Have you seen or consulted any other health care providers outside of the [address] [place] [address] since your last visit? no Include any pap smears or colon screening.  no

## 2022-03-17 NOTE — PROGRESS NOTES
[address], [address]  [place], [address] [place] [address] Street  Phone: [phone]  Fax: [phone]        Shea Tracy  : 1960  PCP: Say Johnson MD    PROGRESS NOTE      ASSESSMENT AND PLAN    Diagnoses and all orders for this visit:    1. Epidural lipomatosis  -     pregabalin (Lyrica) 50 mg capsule; Take 1 Capsule by mouth two (2) times a day. Max Daily Amount: 100 mg. Month 1: one po qhs x 1 week, then increase to one po bid thereafter  -     SCHEDULE SURGERY    2. Lumbar facet arthropathy  -     REFERRAL TO PAIN MANAGEMENT  -     pregabalin (Lyrica) 50 mg capsule; Take 1 Capsule by mouth two (2) times a day. Max Daily Amount: 100 mg. Month 1: one po qhs x 1 week, then increase to one po bid thereafter        1. 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 2. Referral to pain management, pt was on chronic Norco through prior PCP who has retired. 3. Risks, benefits, alternatives, and limitations of CECE discussed with patient. Patient wishes to proceed. 4. Schedule CECE L5-S1  5. Trial of Lyrica 50 mg BID  6. Discussed indications for RFA and sx    Follow-up and Dispositions    · Return in about 5 weeks (around 2022) for after Injections. HISTORY OF PRESENT ILLNESS      Benjamin Lema is a 59 y.o. male presents for follow up of back pain/MRI. He continues to have constant back pain. Radiates into buttocks. His pain is exacerbated with standing and bending. He notes an increase in pain after playing with his grandchildren. He states the trigger point injections last visit provided no significant benefit. New PCP will not write for any more [place] (unlike prior PCP who retired).     Pain Assessment  3/17/2022   Location of Pain Back   Location Modifiers -   Severity of Pain 3   Quality of Pain Aching   Duration of Pain Persistent   Frequency of Pain Constant   Aggravating Factors Other (Comment)   Aggravating Factors Comment activities   Limiting Behavior Yes   Relieving Factors Other (Comment)   Relieving Factors Comment a little bit of everything   Result of Injury No         Investigations:   L MRI 2/2022: progression of DDD, epidural lipomatosis. Mild stenosis L2/3/4/5, moderate FA with inflammation. My read: worst level stenosis L3-4  L XR AP/lat/flex/ext 4V 11/29/2021 (I personally reviewed these images): facet changes L3 to sacrum, no instability  L MRI 2015: disc protrusion with FA L4/5/S1, mild stenosis  Spine surgery consult: no     Treatments:  Physical therapy: no  Spinal injections: B/L L5-S1 facet injections 2017 with benefit  Spinal surgery- no  Beneficial medications: Ibuprofen, Norco (PCP weaning)  Failed medications: Cymbalta - mood swings     Work Status: shipyard as a matt  Pertinent PMHx:  Thyroid disease, rotator cuff repair B/L.        PHYSICAL EXAMINATION    Visit Vitals  /80 (BP 1 Location: Left upper arm, BP Patient Position: Sitting, BP Cuff Size: Adult)   Pulse 70   Temp 96.9 °F (36.1 °C) (Temporal)   Ht 6' 1\" (1.854 m)   Wt 223 lb (101.2 kg)   SpO2 98%   BMI 29.42 kg/m²       Lumbar flexion to knees.  Pain with facet loading  Intact tandem gait and heel walk  SLR negative  LE motor intact              Written by Ivan Figueroa, as dictated by Hitesh Mckeon MD.

## 2022-03-17 NOTE — LETTER
3/18/2022    Patient: Letitia Mondragon   YOB: 1960   Date of Visit: 3/17/2022     Diane Johnson MD  [address] [address]  Via Fax: [phone]    Dear Diane Johnson MD,      Thank you for referring Mr. Tita Pacheco to [place] [facility] for evaluation. My notes for this consultation are attached. If you have questions, please do not hesitate to call me. I look forward to following your patient along with you.       Sincerely,    Roseanne David MD

## 2022-03-28 ENCOUNTER — TELEPHONE (OUTPATIENT)
Dept: ORTHOPEDIC SURGERY | Age: 62
End: 2022-03-28

## 2022-03-29 ENCOUNTER — APPOINTMENT (OUTPATIENT)
Dept: GENERAL RADIOLOGY | Age: 62
End: 2022-03-29
Attending: PHYSICAL MEDICINE & REHABILITATION
Payer: COMMERCIAL

## 2022-03-29 ENCOUNTER — HOSPITAL ENCOUNTER (OUTPATIENT)
Age: 62
Setting detail: OUTPATIENT SURGERY
Discharge: HOME | End: 2022-03-29
Attending: PHYSICAL MEDICINE & REHABILITATION | Admitting: PHYSICAL MEDICINE & REHABILITATION
Payer: COMMERCIAL

## 2022-03-29 VITALS
RESPIRATION RATE: 16 BRPM | SYSTOLIC BLOOD PRESSURE: 142 MMHG | DIASTOLIC BLOOD PRESSURE: 76 MMHG | TEMPERATURE: 97.9 F | HEART RATE: 63 BPM | OXYGEN SATURATION: 100 %

## 2022-03-29 PROBLEM — M48.07 STENOSIS OF LUMBOSACRAL SPINE: Status: ACTIVE | Noted: 2022-03-29

## 2022-03-29 PROCEDURE — 74011000636 HC RX REV CODE- 636: Performed by: PHYSICAL MEDICINE & REHABILITATION

## 2022-03-29 PROCEDURE — 74011250636 HC RX REV CODE- 250/636: Performed by: PHYSICAL MEDICINE & REHABILITATION

## 2022-03-29 PROCEDURE — 74011000250 HC RX REV CODE- 250: Performed by: PHYSICAL MEDICINE & REHABILITATION

## 2022-03-29 PROCEDURE — 77030014124 HC TY EPDRL BBMI -A: Performed by: PHYSICAL MEDICINE & REHABILITATION

## 2022-03-29 PROCEDURE — 2709999900 HC NON-CHARGEABLE SUPPLY: Performed by: PHYSICAL MEDICINE & REHABILITATION

## 2022-03-29 PROCEDURE — 62323 NJX INTERLAMINAR LMBR/SAC: CPT | Performed by: PHYSICAL MEDICINE & REHABILITATION

## 2022-03-29 PROCEDURE — 76010000009 HC PAIN MGT 0 TO 30 MIN PROC: Performed by: PHYSICAL MEDICINE & REHABILITATION

## 2022-03-29 PROCEDURE — 74011250637 HC RX REV CODE- 250/637: Performed by: PHYSICAL MEDICINE & REHABILITATION

## 2022-03-29 RX ORDER — LIDOCAINE HYDROCHLORIDE 10 MG/ML
INJECTION, SOLUTION EPIDURAL; INFILTRATION; INTRACAUDAL; PERINEURAL AS NEEDED
Status: DISCONTINUED | OUTPATIENT
Start: 2022-03-29 | End: 2022-03-29 | Stop reason: HOSPADM

## 2022-03-29 RX ORDER — DEXAMETHASONE SODIUM PHOSPHATE 100 MG/10ML
INJECTION INTRAMUSCULAR; INTRAVENOUS AS NEEDED
Status: DISCONTINUED | OUTPATIENT
Start: 2022-03-29 | End: 2022-03-29 | Stop reason: HOSPADM

## 2022-03-29 RX ORDER — DIAZEPAM 5 MG/1
5-20 TABLET ORAL ONCE
Status: COMPLETED | OUTPATIENT
Start: 2022-03-29 | End: 2022-03-29

## 2022-03-29 RX ADMIN — DIAZEPAM 10 MG: 5 TABLET ORAL at 08:57

## 2022-03-29 NOTE — PERIOP NOTES
Small frequent sips of Pedialyte. May give Zofran as needed and as directed. Follow-up with primary care practitioner as needed for persistent symptoms. Watch for signs of dehydration and return to the emergency department as needed.      Diet For Vomiting, With or Without Diarrhea (Child Under 2 Years)  First  To treat vomiting and prevent dehydration, give small amounts of fluids at frequent intervals.  · Begin with an oral rehydration solution. This is available at drugstores and most grocery stores. No prescription is needed. Give 1/2 to 1 teaspoon (2.5 to 5 ml) every 1 to 2 minutes. Even if vomiting occurs, continue feeding as directed. A lot of the fluid will be absorbed.  · As vomiting lessens, give larger amounts of oral rehydration solution at longer intervals. Keep doing this until your child is making urine and has no interest in drinking. Don't give your child plain water, milk, formula, or other liquids until vomiting stops. Avoid high fructose juices. They can irritate the stomach and cause vomiting to persist.  · If frequent vomiting continues for more than 2 hours despite the above method, call your doctor or this facility.  Note: Your child may be thirsty and want to drink faster. If the child is still vomiting, give fluids only at the prescribed rate. The idea is not to fill the stomach with each feeding. This will cause more vomiting.  Then  If your child is  or bottle fed, continue normal breast or formula feedings unless advised otherwise by the health care provider.  If child is on solid food (over 1 year old):  · After 2 hours with no vomiting, begin with small amounts of milk or formula and other fluids. Increase the amount as tolerated.  · Other clear liquids such as popsicles and gelatin water (1 teaspoon [5ml] of flavored gelatin in 4 ounces of water) may be introduced.  · After 12 to 24 hours with no vomiting, slowly resume a normal diet as tolerated.  © 9384-0349 The  Patient verbally consents to HIPAA and verbalizes understanding of discharge instructions. Signature pad not working. [employer]. [address] [place] [address]. All rights reserved. This information is not intended as a substitute for professional medical care. Always follow your healthcare professional's instructions.        MEDICATION: ZOFRAN  Zofran (generic name is ondansetron) is used to treat nausea and vomiting.  DIRECTIONS FOR USE:You may take this drug with or without food. Take as directed by your doctor.  WHAT TO WATCH FOR:  POSSIBLE SIDE EFFECTS: Diarrhea or constipation, headache, lightheaded, drowsy, blurred vision (If these symptoms persist or worsen contact your doctor). Chest pain (Contact your doctor or go to the Emergency Department promptly).  ALLERGIC REACTIONS: Rash, itching, swelling, trouble swallowing or breathing --> (Contact your doctor or go to the Emergency Department promptly.)  DRUG INTERACTIONS: Before starting this medicine, be sure your doctor knows if you are taking any of the following drugs:  -- none  WARNINGS:  -- May cause drowsiness. DO NOT DRIVE, ride a bicycle or operate dangerous equipment while taking this medicine until you know how it will affect you.  -- Avoid alcohol use while taking this medicine. Side effects may increase with alcohol.  [NOTE: This information topic may not include all directions, precautions, medical conditions, drug/food interactions and warnings for this drug. Check with your doctor, nurse or pharmacist for any questions that you may have.]  © 1890-8831 [employer], [address] [place] [address]. All rights reserved. This information is not intended as a substitute for professional medical care. Always follow your healthcare professional's instructions.

## 2022-03-29 NOTE — PROCEDURES
Intralaminar Epidural Steroid Procedure Note        Patient Name   71[facility]  Date of Procedure: March 29, 2022  Preoperative Diagnosis: Lumbar spinal stenosis  Postoperative Diagnosis: Same  Location [facility], [address]      Procedure:  Epidural Steroid Injection    Consent:  Informed consent was obtained prior to the procedure. In addition to the potential risks associated with the procedure itself, the patient was informed both verbally and in writing of the potential side effects of the use of glucocorticoid. The patient appeared to comprehend the informed consent and desired to have the procedure performed. Procedure in Detail:  The patient was taken to the procedure suite and placed in the prone position on the operating table on appropriate padding. The posterior lumbar region was prepped and draped in the usual sterile fashion. Intraoperative fluoroscopy was used to localize the L5-S1 interspace. The skin was infiltrated with 1% lidocaine. An 18-gauge standard spinal Tuohy needle was advanced into the epidural space at L5-S1 under fluoroscopic guidance using the loss of resistance technique. No cerebrospinal fluid was seen throughout the procedure. Yes  A small amount of Isovue was injected into the epidural space, confirming appropriated needle placement on fluoroscopy. No vascular uptake was identified. Next, 2ml of 1% Lidocaine and 10mg of preservative free Dexamethasone were injected via the Tuohy needle. The needle was removed from the patient. The patient tolerated the procedure well and was discharged home with designated  and care instructions. Patient reported izzy-procedural pain on Visual Analog Scale:  pre-5; post-2.       Signed By: Yakelin Li MD                      March 29, 2022

## 2022-03-29 NOTE — INTERVAL H&P NOTE
Update History & Physical    The Patient's History and Physical of March 17, 2022 was reviewed. There was no change. The surgical site was confirmed by the patient and me. Plan:  The risk, benefits, expected outcome, and alternative to the recommended procedure have been discussed with the patient. Patient understands and wants to proceed with the procedure.     Electronically signed by Job Sim MD on 3/29/2022 at 9:27 AM

## 2022-04-06 ENCOUNTER — TELEPHONE (OUTPATIENT)
Dept: ORTHOPEDIC SURGERY | Age: 62
End: 2022-04-06

## 2022-05-12 ENCOUNTER — OFFICE VISIT (OUTPATIENT)
Dept: ORTHOPEDIC SURGERY | Age: 62
End: 2022-05-12
Payer: COMMERCIAL

## 2022-05-12 VITALS
TEMPERATURE: 97.5 F | OXYGEN SATURATION: 99 % | HEART RATE: 68 BPM | HEIGHT: 73 IN | BODY MASS INDEX: 29.55 KG/M2 | WEIGHT: 223 LBS

## 2022-05-12 DIAGNOSIS — M47.816 LUMBAR SPONDYLOSIS: Primary | ICD-10-CM

## 2022-05-12 DIAGNOSIS — D17.79 EPIDURAL LIPOMATOSIS: ICD-10-CM

## 2022-05-12 DIAGNOSIS — M47.816 LUMBAR FACET ARTHROPATHY: ICD-10-CM

## 2022-05-12 PROCEDURE — 99214 OFFICE O/P EST MOD 30 MIN: CPT | Performed by: PHYSICAL MEDICINE & REHABILITATION

## 2022-05-12 RX ORDER — IBUPROFEN 600 MG/1
600 TABLET ORAL
Qty: 90 TABLET | Refills: 0 | Status: SHIPPED | OUTPATIENT
Start: 2022-05-12 | End: 2022-07-15

## 2022-05-12 RX ORDER — GABAPENTIN 300 MG/1
CAPSULE ORAL
Qty: 60 CAPSULE | Refills: 1 | Status: SHIPPED | OUTPATIENT
Start: 2022-05-12 | End: 2022-07-11 | Stop reason: SINTOL

## 2022-05-12 NOTE — PROGRESS NOTES
[facility] [address]  [address], [address]  [place], [address] [place] [address] Street  Phone: [phone]  Fax: [phone]        Gila Brooke  : 1960  PCP: None    PROGRESS NOTE      ASSESSMENT AND PLAN    Diagnoses and all orders for this visit:    1. Lumbar spondylosis  -     ibuprofen (MOTRIN) 600 mg tablet; Take 1 Tablet by mouth three (3) times daily (with meals). 2. Epidural lipomatosis  -     ibuprofen (MOTRIN) 600 mg tablet; Take 1 Tablet by mouth three (3) times daily (with meals). -     gabapentin (NEURONTIN) 300 mg capsule; Month#1: Take one po qhs x1 week, then increase to one po bid thereafter    3. Lumbar facet arthropathy  -     ibuprofen (MOTRIN) 600 mg tablet; Take 1 Tablet by mouth three (3) times daily (with meals). 1. Radha Shook is a 59 y.o. male w/mechanical and stenotic pain. Failed CECE. Trial antineuritics, then RF for axial pain. 2. Rx for Ibuprofen. Patient advised to take it PRN with food  3. Trial of Gabapentin 300 mg. Take 1 po QHS x one week then increase to 1 po BID thereafter. Lyrica not approved. Follow-up and Dispositions    · Return in about 6 weeks (around 2022) for medication management. HISTORY OF PRESENT ILLNESS      Radha Shook is a 59 y.o. male presents for follow up of back pain. LV trial of Lyrica 50 mg BID    Patient received CECE L5-S1 3/2022 which made his pain worse. Denies side effects. He reports a constant ache in his back that is worse in the morning. The pain occasionally radiates into his buttocks. His standing and walking tolerance is unchanged. Denies numbness, tingling, or weakness BLE. Patient reports that his Lyrica was not authorized. He states that Ibuprofen helps. Has not heard from PM. Previously on opioids through PCP.     Pain Assessment  2022   Location of Pain Back   Location Modifiers -   Severity of Pain 7   Quality of Pain Aching   Duration of Pain Persistent Frequency of Pain Constant   Aggravating Factors Other (Comment)   Aggravating Factors Comment everything   Limiting Behavior Some   Relieving Factors Nothing; Ice   Relieving Factors Comment -   Result of Injury No         Investigations:   L MRI 2/2022: progression of DDD, epidural lipomatosis. Mild stenosis L2/3/4/5, moderate FA with inflammation.  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     PHYSICAL EXAMINATION    Visit Vitals  Pulse 68   Temp 97.5 °F (36.4 °C) (Temporal)   Ht 6' 1\" (1.854 m)   Wt 223 lb (101.2 kg)   SpO2 99%   BMI 29.42 kg/m²       TTP L4-5  No rash  Lumbar flexion to upper thighs, pain with extension  LE strength intact  SLR negative                Written by Vielka Gonzalez, as dictated by Terrence Arias MD.

## 2022-05-12 NOTE — PROGRESS NOTES
Austin Morgan presents today for   Chief Complaint   Patient presents with    Back Pain       Is someone accompanying this pt? no    Is the patient using any DME equipment during OV? no    Depression Screening:  No flowsheet data found. Learning Assessment:  No flowsheet data found. Abuse Screening:  No flowsheet data found. Fall Risk  No flowsheet data found. OPIOID RISK TOOL  No flowsheet data found. Coordination of Care:  1. Have you been to the ER, urgent care clinic since your last visit? no  Hospitalized since your last visit? no    2. Have you seen or consulted any other health care providers outside of the [address] [place] [address] since your last visit? no Include any pap smears or colon screening.  no

## 2022-07-11 ENCOUNTER — OFFICE VISIT (OUTPATIENT)
Dept: ORTHOPEDIC SURGERY | Age: 62
End: 2022-07-11
Payer: COMMERCIAL

## 2022-07-11 VITALS
HEART RATE: 72 BPM | TEMPERATURE: 97.8 F | BODY MASS INDEX: 28.49 KG/M2 | WEIGHT: 222 LBS | OXYGEN SATURATION: 98 % | HEIGHT: 74 IN

## 2022-07-11 DIAGNOSIS — M47.816 LUMBAR SPONDYLOSIS: ICD-10-CM

## 2022-07-11 DIAGNOSIS — D17.79 EPIDURAL LIPOMATOSIS: Primary | ICD-10-CM

## 2022-07-11 PROCEDURE — 99214 OFFICE O/P EST MOD 30 MIN: CPT | Performed by: PHYSICAL MEDICINE & REHABILITATION

## 2022-07-11 RX ORDER — PREGABALIN 75 MG/1
75 CAPSULE ORAL 2 TIMES DAILY
Qty: 180 CAPSULE | Refills: 0 | Status: SHIPPED | OUTPATIENT
Start: 2022-07-11 | End: 2022-10-12 | Stop reason: SDUPTHER

## 2022-07-11 NOTE — LETTER
7/12/2022    Patient: Manasa Ramos   YOB: 1960   Date of Visit: 7/11/2022     Sandor Schwab, MD  [address] [place] [id number]  Via Fax: [phone]    Dear Sandor Schwab, MD,      Thank you for referring Mr. Kenyetta Lainez to [facility] for evaluation. My notes for this consultation are attached. If you have questions, please do not hesitate to call me. I look forward to following your patient along with you.       Sincerely,    Cortney Roman MD

## 2022-07-11 NOTE — PROGRESS NOTES
Carla Cespedes presents today for   Chief Complaint   Patient presents with    Back Pain     lower       Is someone accompanying this pt? no    Is the patient using any DME equipment during OV? no    Depression Screening:  No flowsheet data found. Learning Assessment:  No flowsheet data found. Abuse Screening:  No flowsheet data found. Fall Risk  No flowsheet data found. OPIOID RISK TOOL  No flowsheet data found. Coordination of Care:  1. Have you been to the ER, urgent care clinic since your last visit? no  Hospitalized since your last visit? no    2. Have you seen or consulted any other health care providers outside of the [address] [place] [address] since your last visit? Yes pcp Include any pap smears or colon screening.  Yes colon

## 2022-07-11 NOTE — PROGRESS NOTES
Lawanda Solomon [facility] [address].  [address], [address]  [place], [address] [place] [address] Street  Phone: [phone]  Fax: [phone]        Chan Reddy  : 1960  PCP: Demetra Severance, MD    PROGRESS NOTE      ASSESSMENT AND PLAN    Diagnoses and all orders for this visit:    1. Epidural lipomatosis  -     pregabalin (Lyrica) 75 mg capsule; Take 1 Capsule by mouth two (2) times a day. Max Daily Amount: 150 mg.    2. Lumbar spondylosis  -     pregabalin (Lyrica) 75 mg capsule; Take 1 Capsule by mouth two (2) times a day. Max Daily Amount: 150 mg.        1. 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 He has failed gabapentin and CECE in the past.  He is not interested in surgery at this time. 2. Trial of Lyrica 75 mg. Take 1 po QHS x one week then increase to 1 po BID thereafter. 3. Patient will take Gabapentin 600 mg QHS until he receives his Lyrica  4. Discussed recommendation of lowest dose possible for the shortest possible for NSAIDs. Follow-up and Dispositions    · Return in about 6 weeks (around 2022) for medication management. HISTORY OF PRESENT ILLNESS      Ayaan Stock is a 59 y.o. male presents for follow up of back pain. LV trial of Gabapentin 300 mg BID. Pt reports his low back pain has remained unchanged and occasionally radiates into his buttocks. His pain is exacerbated with standing and walking. Pt states he is on his feet 8 hours per day. Patient states Gabapentin is ineffective. He cannot tolerate the day time dose due to somnolence. He continues to take Ibuprofen PRN with benefit. Denies side effects.      Pain Assessment  2022   Location of Pain Back   Location Modifiers (No Data)   Severity of Pain 3   Quality of Pain Aching   Duration of Pain Persistent   Frequency of Pain Constant   Aggravating Factors Bending;Stretching;Straightening;Exercise;Kneeling;Squatting;Standing;Walking;Stairs   Aggravating Factors Comment -   Limiting Behavior Yes   Relieving Factors Ice   Relieving Factors Comment medication   Result of Injury No         Investigations:   L MRI 2/2022: progression of DDD, epidural lipomatosis. Mild stenosis L2/3/4/5, moderate FA with inflammation.  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        PHYSICAL EXAMINATION    Visit Vitals  Pulse 72   Temp 97.8 °F (36.6 °C) (Temporal)   Ht 6' 2\" (1.88 m)   Wt 222 lb (100.7 kg)   SpO2 98% Comment: RA   BMI 28.50 kg/m²       LE strength intact  SLR negative  TTP L4-5  Intact tandem gait              Written by Segun Whitley, as dictated by Renea Price MD.

## 2022-10-12 ENCOUNTER — OFFICE VISIT (OUTPATIENT)
Dept: ORTHOPEDIC SURGERY | Age: 62
End: 2022-10-12
Payer: COMMERCIAL

## 2022-10-12 VITALS
HEIGHT: 74 IN | TEMPERATURE: 97.3 F | RESPIRATION RATE: 18 BRPM | BODY MASS INDEX: 28.62 KG/M2 | HEART RATE: 64 BPM | OXYGEN SATURATION: 99 % | WEIGHT: 223 LBS

## 2022-10-12 DIAGNOSIS — R20.2 PARESTHESIA OF UPPER LIMB: ICD-10-CM

## 2022-10-12 DIAGNOSIS — M72.0: ICD-10-CM

## 2022-10-12 DIAGNOSIS — D17.79 EPIDURAL LIPOMATOSIS: Primary | ICD-10-CM

## 2022-10-12 DIAGNOSIS — M47.816 LUMBAR SPONDYLOSIS: ICD-10-CM

## 2022-10-12 PROCEDURE — 99214 OFFICE O/P EST MOD 30 MIN: CPT | Performed by: PHYSICAL MEDICINE & REHABILITATION

## 2022-10-12 RX ORDER — PREGABALIN 100 MG/1
100 CAPSULE ORAL 2 TIMES DAILY
Qty: 180 CAPSULE | Refills: 1 | Status: SHIPPED | OUTPATIENT
Start: 2022-10-12

## 2022-10-12 RX ORDER — LEVOTHYROXINE SODIUM 125 UG/1
TABLET ORAL
COMMUNITY
Start: 2022-08-26

## 2022-10-12 NOTE — PROGRESS NOTES
Neemaayaz Ho presents today for   Chief Complaint   Patient presents with    Back Pain       Is someone accompanying this pt? no    Is the patient using any DME equipment during OV? no    Depression Screening:  No flowsheet data found. Learning Assessment:  No flowsheet data found. Abuse Screening:  No flowsheet data found. Fall Risk  No flowsheet data found. OPIOID RISK TOOL  No flowsheet data found. Coordination of Care:  1. Have you been to the ER, urgent care clinic since your last visit? no  Hospitalized since your last visit? no    2. Have you seen or consulted any other health care providers outside of the [address] [place] [address] since your last visit? no Include any pap smears or colon screening.  no

## 2022-10-12 NOTE — LETTER
10/12/2022    Patient: Veronica Guardado   YOB: 1960   Date of Visit: 10/12/2022     Erika Richter MD  [address] [place] [id number]  Via Fax: [phone]    Dear Erika Richter MD,      Thank you for referring Mr. Monster Canchola to [facility] for evaluation. My notes for this consultation are attached. If you have questions, please do not hesitate to call me. I look forward to following your patient along with you.       Sincerely,    Karen Callahan MD
Yes

## 2022-10-12 NOTE — PROGRESS NOTES
Law[facility] [address]  [address], [address]  [facility], [address]  Phone: [phone]  Fax: [phone]        [facility] 1960  PCP: Ryan Tidwell MD    PROGRESS NOTE      ASSESSMENT AND PLAN    Diagnoses and all orders for this visit:    1. Epidural lipomatosis  -     pregabalin (Lyrica) 100 mg capsule; Take 1 Capsule by mouth two (2) times a day. Max Daily Amount: 200 mg.    2. Lumbar spondylosis  -     pregabalin (Lyrica) 100 mg capsule; Take 1 Capsule by mouth two (2) times a day. Max Daily Amount: 200 mg.    3. Paresthesia of upper limb    4. Dupuytren's disease of palm with contracture      Keisha Ponce is a 57 y.o. male with radiating lumbar pain. He has had some benefit with Lyrica. Uncertain if his arm paresthesias are a side effect of Lyrica or a new issue. No evidence of cervical myelopathy. Will titrate gradually and monitor for side effects. .   Dose adjustment. Increase Lyrica to 100 mg BID  Continue PRN Ibuprofen with food. Discussed referral to hand surgery if he wishes to have his Dupuytren's contracture addressed. Follow-up and Dispositions    Return in about 3 months (around 2023) for medication management. HISTORY OF PRESENT ILLNESS      Keisha Ponce is a 57 y.o. male presents for follow up of back pain. LV trial of Lyrica 75 mg BID. He reports constant low back pain exacerbated with prolonged standing and sitting. His standing tolerance varies day to day. Pt notes he is sitting more at job. Pt reports paresthesias in his BUE, which subsides as the day progresses. Denies weakness BUE. Denies dropping things. Denies loss of balance. Denies insomnia    He states Lyrica helps some. His BUE paresthesias started after taking the Lyrica, but is unsure if this medication is causing it. He takes Ibuprofen as needed. He is otherwise been in his usual state of health. He is very busy at work right now.   We will not have the day off until sometime in November. Pain Assessment  10/12/2022   Location of Pain Back   Location Modifiers -   Severity of Pain 8   Quality of Pain Dull;Aching   Duration of Pain Persistent   Frequency of Pain Constant   Aggravating Factors Bending;Stretching;Straightening   Aggravating Factors Comment -   Limiting Behavior Yes   Relieving Factors Ice   Relieving Factors Comment -   Result of Injury No         Investigations:   L MRI 2/2022: progression of DDD, epidural lipomatosis. Mild stenosis L2/3/4/5, moderate FA with inflammation. 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 Pertinent PMHx:  Thyroid disease, rotator cuff repair B/L. PHYSICAL EXAMINATION    Visit Vitals  Pulse 64   Temp 97.3 °F (36.3 °C) (Temporal)   Resp 18   Ht 6' 2\" (1.88 m)   Wt 223 lb (101.2 kg)   SpO2 99% Comment: RA   BMI 28.63 kg/m²     DTRs 1+ UE  Intact UE strength  Palmar cords bilaterally with contracture  Negative Glenroy's.   Intact tandem gait  LE strength intact  SLR negative                Written by Sun Frank, as dictated by Gallo Pinedo MD.

## 2023-05-18 ENCOUNTER — HOSPITAL ENCOUNTER (OUTPATIENT)
Facility: HOSPITAL | Age: 63
Discharge: HOME | End: 2023-05-18
Payer: COMMERCIAL

## 2023-05-18 DIAGNOSIS — R93.89 ABNORMAL CT SCAN: ICD-10-CM

## 2023-05-18 DIAGNOSIS — K76.0 NAFLD (NONALCOHOLIC FATTY LIVER DISEASE): ICD-10-CM

## 2023-05-18 PROCEDURE — 74250 X-RAY XM SM INT 1CNTRST STD: CPT

## 2023-05-18 PROCEDURE — 2500000003 HC RX 250 WO HCPCS: Performed by: INTERNAL MEDICINE

## 2023-05-18 RX ADMIN — BARIUM SULFATE 140 ML: 960 POWDER, FOR SUSPENSION ORAL at 09:46

## 2023-11-01 ENCOUNTER — OFFICE VISIT (OUTPATIENT)
Age: 63
End: 2023-11-01
Payer: COMMERCIAL

## 2023-11-01 VITALS
HEIGHT: 74 IN | HEART RATE: 79 BPM | OXYGEN SATURATION: 97 % | BODY MASS INDEX: 28.85 KG/M2 | WEIGHT: 224.8 LBS | RESPIRATION RATE: 18 BRPM | TEMPERATURE: 98.2 F

## 2023-11-01 DIAGNOSIS — M51.36 DDD (DEGENERATIVE DISC DISEASE), LUMBAR: ICD-10-CM

## 2023-11-01 DIAGNOSIS — M48.061 SPINAL STENOSIS OF LUMBAR REGION WITHOUT NEUROGENIC CLAUDICATION: ICD-10-CM

## 2023-11-01 DIAGNOSIS — M47.816 LUMBAR SPONDYLOSIS: Primary | ICD-10-CM

## 2023-11-01 PROCEDURE — 72110 X-RAY EXAM L-2 SPINE 4/>VWS: CPT | Performed by: PHYSICAL MEDICINE & REHABILITATION

## 2023-11-01 PROCEDURE — 1123F ACP DISCUSS/DSCN MKR DOCD: CPT | Performed by: PHYSICAL MEDICINE & REHABILITATION

## 2023-11-01 PROCEDURE — 99213 OFFICE O/P EST LOW 20 MIN: CPT | Performed by: PHYSICAL MEDICINE & REHABILITATION

## 2023-11-01 RX ORDER — BACLOFEN 10 MG/1
10-20 TABLET ORAL
Qty: 180 TABLET | Refills: 0 | Status: SHIPPED | OUTPATIENT
Start: 2023-11-01

## 2023-11-01 NOTE — PROGRESS NOTES
[facility]    [address], [address] [place] [address]   Phone: [phone]  Fax: [phone]        Lucero Gonzales  : 1960  PCP: Beba Lei MD    PROGRESS NOTE      ASSESSMENT AND PLAN    Marilyn Rodriguez was seen today for back problem, lower back pain and back pain. Diagnoses and all orders for this visit:    Lumbar spondylosis  -     AMB POC XRAY, SPINE, LUMBOSACRAL; 4+  -     MRI LUMBAR SPINE WO CONTRAST; Future  -     baclofen (LIORESAL) 10 MG tablet; Take 1-2 tablets by mouth nightly as needed (pain)    DDD (degenerative disc disease), lumbar  -     MRI LUMBAR SPINE WO CONTRAST; Future    Spinal stenosis of lumbar region without neurogenic claudication  -     MRI LUMBAR SPINE WO CONTRAST; Future        Ya Cintron is a 70 y.o. male with progressive mechanical low back pain interfering with his day-to-day activities. He has not been able to play golf in many months. He has been through extensive conservative measures including physical therapy, medications, spinal injections. MRI for further evaluation, possible surgery. Referral placed to spine surgery. .   Trial prn Baclofen    Follow-up and Dispositions    Return if symptoms worsen or fail to improve. HISTORY OF PRESENT ILLNESS      Ya Cintron is a 70 y.o. male presents for follow up of lumbar pain. LV 10/2022, inc Lyrica 100 mg BID. He continues with daily pain. He endorses morning stiffness with difficulty walking to the bathroom. He notes that increased walking helps with the back pain, but bending, stooping, and lifting elicits pain. He often utilizes ice compresses on his back which helps with pain. He denies numbness and tingling in his BLE. His leg pain has since resolved. He also notes minimal dull pain in his right ankle and foot. She notes some changes to his life. His son, who has a hx of substance abuse, has recently moved home.  He has not been able to

## 2023-11-01 NOTE — PROGRESS NOTES
Hima Cao presents today for   Chief Complaint   Patient presents with    Back Problem    Lower Back Pain    Back Pain       Is someone accompanying this pt? no    Is the patient using any DME equipment during OV? no    Depression Screening:       No data to display                Learning Assessment:  No flowsheet data found. Abuse Screening:       No data to display                Fall Risk  No flowsheet data found. OPIOID RISK TOOL  No flowsheet data found. Coordination of Care:  1. Have you been to the ER, urgent care clinic since your last visit? no  Hospitalized since your last visit? no    2. Have you seen or consulted any other health care providers outside of the [address] [place] [address] since your last visit? no Include any pap smears or colon screening.  no

## 2023-11-14 ENCOUNTER — HOSPITAL ENCOUNTER (OUTPATIENT)
Facility: HOSPITAL | Age: 63
Discharge: HOME | End: 2023-11-17
Attending: PHYSICAL MEDICINE & REHABILITATION
Payer: COMMERCIAL

## 2023-11-14 DIAGNOSIS — M48.061 SPINAL STENOSIS OF LUMBAR REGION WITHOUT NEUROGENIC CLAUDICATION: ICD-10-CM

## 2023-11-14 DIAGNOSIS — M47.816 LUMBAR SPONDYLOSIS: ICD-10-CM

## 2023-11-14 DIAGNOSIS — M51.36 DDD (DEGENERATIVE DISC DISEASE), LUMBAR: ICD-10-CM

## 2023-11-14 PROCEDURE — 72148 MRI LUMBAR SPINE W/O DYE: CPT

## 2023-12-05 ENCOUNTER — TELEPHONE (OUTPATIENT)
Age: 63
End: 2023-12-05

## 2023-12-05 DIAGNOSIS — M48.061 SPINAL STENOSIS OF LUMBAR REGION WITHOUT NEUROGENIC CLAUDICATION: Primary | ICD-10-CM

## 2023-12-05 DIAGNOSIS — M47.816 LUMBAR SPONDYLOSIS: ICD-10-CM

## 2023-12-05 NOTE — TELEPHONE ENCOUNTER
Patient states Yvette Dakins referred him to a spine surgeon on his last visit, but he hasn't heard from anyone yet. I was unable to locate an order in patients chart. .     Patient can be reached at [phone].

## 2023-12-05 NOTE — TELEPHONE ENCOUNTER
Message was reviewed by Dr. Sebas Barcenas. She reviewed the chart and is not sure why the referral was not ordered. She placed another referral order for a surgical consult. I called and spoke to Mr. Jamarcus Reyes. The pt was identified using 2 pt identifiers. I informed him of what happened and offered an apology for the delay. He is aware that a referral has been ordered for the surgical consult. I let him know that I will call him to let him know where the referral gets faxed and provide him with the office information. He verbalized understanding and is ok with this.

## 2023-12-06 NOTE — TELEPHONE ENCOUNTER
I called and spoke to the pt. His referral was faxed over to Dr. Alisson Pace office for a surgical consult. The pt was identified using 2 pt identifiers. He was provided this information and given the office number to contact if he has any questions. Emmie Josefa Adolfo states that he will give them a couple of days. If he does not get a call then he will call them.

## 2023-12-28 ENCOUNTER — HOSPITAL ENCOUNTER (OUTPATIENT)
Facility: HOSPITAL | Age: 63
Setting detail: RECURRING SERIES
Discharge: HOME | End: 2023-12-31
Payer: COMMERCIAL

## 2023-12-28 PROCEDURE — 97535 SELF CARE MNGMENT TRAINING: CPT

## 2023-12-28 PROCEDURE — 97162 PT EVAL MOD COMPLEX 30 MIN: CPT

## 2023-12-28 NOTE — PROGRESS NOTES
PT DAILY TREATMENT NOTE/LUMBAR EVAL     Patient Name: Silvestre Partida    Date: 2023    : 1960  Insurance: Payor: BCBS / Plan: BCBS OUT OF STATE / Product Type: *No Product type* /      Patient  verified yes     Visit #   Current / Total 1 24   Time   In / Out 3:10 3:45   Pain   In / Out 4/10 4/10   Subjective Functional Status/Changes: Low back pain       Treatment Area: Other low back pain [M54.59]  SUBJECTIVE  Pain Level (0-10 scale): 4/10  [x]constant []intermittent []improving [x]worsening []no change since onset    Any medication changes, allergies to medications, adverse drug reactions, diagnosis change, or new procedure performed?: [x] No    [] Yes (see summary sheet for update)  Subjective functional status/changes:     Subjective functional status/changes:     PLOF: functionally independent, no AD. Has limitation in doing household activities and sleep disturbance due to pain.   Mechanism of Injury: The patient report he has had chase pain for 30 years and maybe due to playing sports in childhood resulting in arthritis. Pt had multiple Cortizone shots with temporary improvement and tried therapy with no improvement. Pt reports coughing and sneezing, playing golf and working around the house makes the worst. Standing and walking eases some pain. Pt had multiple MRI and x-rays of lower back. Pt was seen by a surgeon and he has suggested fusion or nerve oblation but recommended therapy before making final decision to strengthen back muscles. Pt reports he feels tingling in left buttock but pain does not radiates into legs. Pt described pain as ache most of the time but with movement, cough and sneeze feels sharp pain.   Current symptoms/Complaints: 4/10 at best with standing and walking; 10/10 at worst with coughing and sneezing, playing golf and working around the house; pt reports they are unable to sleep through the night secondary to pain  Previous Treatment/Compliance: Multiple

## 2023-12-28 NOTE — PROGRESS NOTES
[facility]  [address] [facility] [address] [place] [address] Ph:[phone] Fx: [phone]    PLAN OF CARE/ Statement of Necessity for Physical Therapy Services           Patient name: Silvestre Partida Start of Care: 2023   Referral source: Wilder Vazquez MD : 1960    Medical Diagnosis: Other low back pain [M54.59]       Onset Date: 2023   Treatment Diagnosis: M54.59  OTHER LOWER BACK PAIN                                     Prior Hospitalization: see medical history Provider#: [id number]   Medications: Verified on Patient Summary List     Comorbidities: Heart murmer, hernia, Rotator cuff repair right x2, left x1, surgery of left thumb, Lumbar arthropathy, Thyroid disease.   Prior Level of Function: functionally independent, no AD. Has limitation in doing household activities and sleep disturbance due to pain.      The Plan of Care and following information is based on the information from the initial evaluation.    Assessment / key information:   The patient is a 63-year-old male referred to therapy with low back pain. The patient reported a current pain level of 4/10 which gets worsen to 10/10 with coughing and sneezing, playing golf and working around the house  . The patient reports he played a lots of sports during childhood developed arthritis. The patient has had pain for more than 30 years. Ot received multiple Cortizone shots with minimal improvement, tried therapy multiple times with no improvement. Recent MRI showed disc bulge, arthritis and stenosis. Pt was seen by a surgeon recently and was informed about option of fusion or nerve oblation and referred to therapy to improve strength and flexibility. During the objective assessment, the patient demonstrated increased pain during repeated extension and left side bend. The patient pointed pain in central low back and described as ache in nature. However, with cough and sneeze pain will

## 2024-01-08 ENCOUNTER — HOSPITAL ENCOUNTER (OUTPATIENT)
Facility: HOSPITAL | Age: 64
Setting detail: RECURRING SERIES
Discharge: HOME | End: 2024-01-11
Payer: COMMERCIAL

## 2024-01-08 PROCEDURE — 97110 THERAPEUTIC EXERCISES: CPT

## 2024-01-08 PROCEDURE — 97112 NEUROMUSCULAR REEDUCATION: CPT

## 2024-01-08 NOTE — PROGRESS NOTES
PHYSICAL / OCCUPATIONAL THERAPY - DAILY TREATMENT NOTE     Patient Name: Silvestre Sheehan Reap    Date: 2024    : 1960  Insurance: Payor: BCBS / Plan: BCBS OUT OF STATE / Product Type: *No Product type* /      Patient  verified Yes     Visit #   Current / Total 2 24   Time   In / Out 2:30 3:12   Pain   In / Out 3/10 3/10 (sore)   Subjective Functional Status/Changes: Pt reports he has been complaint with HEP. Bridging aggravated his pain and he stopped doing that one exercise.    Changes to:  Allergies, Med Hx, Sx Hx?   no       TREATMENT AREA =  Other low back pain [M54.59]    OBJECTIVE    Therapeutic Procedures:  Tx Min Billable or 1:1 Min (if diff from Tx Min) Procedure, Rationale, Specifics   25  60076 Neuromuscular Re-Education (timed):  improve balance, coordination, kinesthetic sense, posture, core stability and proprioception to improve patient's ability to develop conscious control of individual muscles and awareness of position of extremities in order to progress to PLOF and address remaining functional goals. (see flow sheet as applicable)    Details if applicable:       17  13199 Therapeutic Exercise (timed):  increase ROM, strength, coordination, balance, and proprioception to improve patient's ability to progress to PLOF and address remaining functional goals. (see flow sheet as applicable)    Details if applicable:            Details if applicable:           Details if applicable:            Details if applicable:     42  [facility] Totals Reminder: bill using total billable min of TIMED therapeutic procedures (example: do not include dry needle or estim unattended, both untimed codes, in totals to left)  8-22 min = 1 unit; 23-37 min = 2 units; 38-52 min = 3 units; 53-67 min = 4 units; 68-82 min = 5 units   Total Total     TOTAL TREATMENT TIME:        42     [x]  Patient Education billed concurrently with other procedures   [x] Review HEP    [] Progressed/Changed HEP, detail:    [] Other detail:

## 2024-01-10 ENCOUNTER — HOSPITAL ENCOUNTER (OUTPATIENT)
Facility: HOSPITAL | Age: 64
Setting detail: RECURRING SERIES
Discharge: HOME | End: 2024-01-13
Payer: COMMERCIAL

## 2024-01-10 PROCEDURE — 97110 THERAPEUTIC EXERCISES: CPT

## 2024-01-10 PROCEDURE — 97112 NEUROMUSCULAR REEDUCATION: CPT

## 2024-01-10 NOTE — PROGRESS NOTES
PHYSICAL / OCCUPATIONAL THERAPY - DAILY TREATMENT NOTE     Patient Name: Silvestre Sheehan Reap    Date: 1/10/2024    : 1960  Insurance: Payor: BCBS / Plan: BCBS OUT OF STATE / Product Type: *No Product type* /      Patient  verified Yes     Visit #   Current / Total 3 24   Time   In / Out 2:28 3:10   Pain   In / Out 3/10 5/10   Subjective Functional Status/Changes: Pt reports no difference after last visit and it may take few visits.    Changes to:  Allergies, Med Hx, Sx Hx?   no       TREATMENT AREA =  Other low back pain [M54.59]    OBJECTIVE    Therapeutic Procedures:  Tx Min Billable or 1:1 Min (if diff from Tx Min) Procedure, Rationale, Specifics   27  61326 Neuromuscular Re-Education (timed):  improve balance, coordination, kinesthetic sense, posture, core stability and proprioception to improve patient's ability to develop conscious control of individual muscles and awareness of position of extremities in order to progress to PLOF and address remaining functional goals. (see flow sheet as applicable)    Details if applicable:       15  64786 Therapeutic Exercise (timed):  increase ROM, strength, coordination, balance, and proprioception to improve patient's ability to progress to PLOF and address remaining functional goals. (see flow sheet as applicable)    Details if applicable:            Details if applicable:           Details if applicable:            Details if applicable:     42  [facility] Totals Reminder: bill using total billable min of TIMED therapeutic procedures (example: do not include dry needle or estim unattended, both untimed codes, in totals to left)  8-22 min = 1 unit; 23-37 min = 2 units; 38-52 min = 3 units; 53-67 min = 4 units; 68-82 min = 5 units   Total Total     TOTAL TREATMENT TIME:        42     [x]  Patient Education billed concurrently with other procedures   [x] Review HEP    [] Progressed/Changed HEP, detail:    [] Other detail:       Objective Information/Functional

## 2024-01-11 ENCOUNTER — TELEPHONE (OUTPATIENT)
Facility: HOSPITAL | Age: 64
End: 2024-01-11

## 2024-01-15 ENCOUNTER — APPOINTMENT (OUTPATIENT)
Facility: HOSPITAL | Age: 64
End: 2024-01-15
Payer: COMMERCIAL

## 2024-01-17 ENCOUNTER — HOSPITAL ENCOUNTER (OUTPATIENT)
Facility: HOSPITAL | Age: 64
Setting detail: RECURRING SERIES
Discharge: HOME | End: 2024-01-20
Payer: COMMERCIAL

## 2024-01-17 PROCEDURE — 97110 THERAPEUTIC EXERCISES: CPT

## 2024-01-17 PROCEDURE — 97112 NEUROMUSCULAR REEDUCATION: CPT

## 2024-01-17 NOTE — PROGRESS NOTES
PHYSICAL / OCCUPATIONAL THERAPY - DAILY TREATMENT NOTE     Patient Name: Silvestre Sheehan Reap    Date: 2024    : 1960  Insurance: Payor: BCBS / Plan: BCBS OUT OF STATE / Product Type: *No Product type* /      Patient  verified Yes     Visit #   Current / Total 4 24   Time   In / Out 230 310   Pain   In / Out 2-3 5   Subjective Functional Status/Changes: Patient reports some mms soreness after his last visit but that it only lasted a \"couple of hours\" . He notices that his back doesn't ache as much when he wakes in the morning.    Changes to:  Allergies, Med Hx, Sx Hx?   no       TREATMENT AREA =  Other low back pain [M54.59]    OBJECTIVE    Therapeutic Procedures:  Tx Min Billable or 1:1 Min (if diff from Tx Min) Procedure, Rationale, Specifics   25  66852 Neuromuscular Re-Education (timed):  improve balance, coordination, kinesthetic sense, posture, core stability and proprioception to improve patient's ability to develop conscious control of individual muscles and awareness of position of extremities in order to progress to PLOF and address remaining functional goals. (see flow sheet as applicable)    Details if applicable:       15  73020 Therapeutic Exercise (timed):  increase ROM, strength, coordination, balance, and proprioception to improve patient's ability to progress to PLOF and address remaining functional goals. (see flow sheet as applicable)    Details if applicable:            Details if applicable:           Details if applicable:            Details if applicable:     40  [facility] Totals Reminder: bill using total billable min of TIMED therapeutic procedures (example: do not include dry needle or estim unattended, both untimed codes, in totals to left)  8-22 min = 1 unit; 23-37 min = 2 units; 38-52 min = 3 units; 53-67 min = 4 units; 68-82 min = 5 units   Total Total     TOTAL TREATMENT TIME:      40     [x]  Patient Education billed concurrently with other procedures   [x] Review HEP    []

## 2024-01-22 ENCOUNTER — HOSPITAL ENCOUNTER (OUTPATIENT)
Facility: HOSPITAL | Age: 64
Setting detail: RECURRING SERIES
Discharge: HOME | End: 2024-01-25
Payer: COMMERCIAL

## 2024-01-22 PROCEDURE — 97110 THERAPEUTIC EXERCISES: CPT

## 2024-01-22 PROCEDURE — 97112 NEUROMUSCULAR REEDUCATION: CPT

## 2024-01-23 ENCOUNTER — TELEPHONE (OUTPATIENT)
Facility: HOSPITAL | Age: 64
End: 2024-01-23

## 2024-01-24 ENCOUNTER — APPOINTMENT (OUTPATIENT)
Facility: HOSPITAL | Age: 64
End: 2024-01-24
Payer: COMMERCIAL

## 2024-01-29 ENCOUNTER — APPOINTMENT (OUTPATIENT)
Facility: HOSPITAL | Age: 64
End: 2024-01-29
Payer: COMMERCIAL

## 2024-01-30 ENCOUNTER — TELEPHONE (OUTPATIENT)
Facility: HOSPITAL | Age: 64
End: 2024-01-30

## 2024-01-31 ENCOUNTER — OFFICE VISIT (OUTPATIENT)
Age: 64
End: 2024-01-31
Payer: COMMERCIAL

## 2024-01-31 ENCOUNTER — APPOINTMENT (OUTPATIENT)
Facility: HOSPITAL | Age: 64
End: 2024-01-31
Payer: COMMERCIAL

## 2024-01-31 VITALS — WEIGHT: 226 LBS | HEIGHT: 74 IN | BODY MASS INDEX: 29 KG/M2

## 2024-01-31 DIAGNOSIS — M47.816 LUMBAR FACET ARTHROPATHY: Primary | ICD-10-CM

## 2024-01-31 DIAGNOSIS — M48.061 SPINAL STENOSIS OF LUMBAR REGION WITHOUT NEUROGENIC CLAUDICATION: ICD-10-CM

## 2024-01-31 PROCEDURE — 99213 OFFICE O/P EST LOW 20 MIN: CPT | Performed by: PHYSICAL MEDICINE & REHABILITATION

## 2024-01-31 PROCEDURE — 1123F ACP DISCUSS/DSCN MKR DOCD: CPT | Performed by: PHYSICAL MEDICINE & REHABILITATION

## 2024-01-31 ASSESSMENT — PATIENT HEALTH QUESTIONNAIRE - PHQ9
2. FEELING DOWN, DEPRESSED OR HOPELESS: 0
SUM OF ALL RESPONSES TO PHQ QUESTIONS 1-9: 0
SUM OF ALL RESPONSES TO PHQ QUESTIONS 1-9: 0
1. LITTLE INTEREST OR PLEASURE IN DOING THINGS: 0
SUM OF ALL RESPONSES TO PHQ9 QUESTIONS 1 & 2: 0
SUM OF ALL RESPONSES TO PHQ QUESTIONS 1-9: 0
SUM OF ALL RESPONSES TO PHQ QUESTIONS 1-9: 0

## 2024-01-31 NOTE — PROGRESS NOTES
Silvestre Partida presents today for   Chief Complaint   Patient presents with    Back Pain       Is someone accompanying this pt? no    Is the patient using any DME equipment during OV? no        Coordination of Care:  1. Have you been to the ER, urgent care clinic since your last visit? no  Hospitalized since your last visit? no    2. Have you seen or consulted any other health care providers outside of the [facility] since your last visit? Ortho Include any pap smears or colon screening. no        
  Frequency of Pain Several times daily   Aggravating Factors Exercise;Stretching;Bending;Standing   Limiting Behavior Yes   Relieving Factors Heat;Ice   Result of Injury No   Work-Related Injury No   Are there other pain locations you wish to document? No         Investigations:   L MRI (11/2023): L3/L4/L5 mild to moderate stenosis, multilevel FA with inflammation  L XR AP/lat/ext/flex: DDD L5-S1. Facet changes L3-sacrum. No instability. Small anterior osteophytes at L5.  L MRI 2/2022: progression of DDD, epidural lipomatosis. Mild stenosis L2/3/4/5, moderate FA with inflammation. My read: worst level  stenosis L3-4  L MRI 2015: disc protrusion with FA L4/5/S1, mild stenosis     Spine surgery consult: Dr. Vazquez (12/2023), no surgery recommended       Treatments:   Physical therapy: Yes (01/2024) helped initially, but then increased pain   Spinal injections: JOSE L5-S1 3/2022 - made his pain worse B/L L5-S1 facet injections 2017  with benefit   Spinal surgery- no   Beneficial medications: Ibuprofen, Norco  (PCP weaning),   Failed medications: Cymbalta - mood swings, Gabapentin - ineffective, somnolence,  Lyrica - ineffective       Work Status: shipyard as a [employer]   RHD. Pertinent PMHx:  Thyroid disease, rotator  cuff repair B/L.      PHYSICAL EXAMINATION    Ht 1.88 m (6' 2\")   Wt 102.5 kg (226 lb)   BMI 29.02 kg/m²     TTP: L4-L5/L5-S1   Lumbar flexion: fingertips to shins  Pain with lumbar extension and facet loading.  LE strength: Intact  SLR: Negative  DTR: 1+ patella  No edema           Written by Elva Reyes, as dictated by Lara Vasquez MD.  This note was created using Dragon transcription software and may contain unintended errors.

## 2024-01-31 NOTE — H&P (VIEW-ONLY)
[facility]    [address] [place] [address], [address]  [place] [address]  Phone: [phone]  Fax: [phone]        Silvestre Partida  : 1960  PCP: None, None    RFA EVALUATION       ASSESSMENT AND PLAN    Silvestre was seen today for back pain.    Diagnoses and all orders for this visit:    Lumbar facet arthropathy  -     Ambulatory Referral to Ortho Injection    Spinal stenosis of lumbar region without neurogenic claudication        Silvestre Partida is a 63 y.o. male with chronic axial low back pain.  Increased pain with facet loading, facet inflammation on lumbar MRI.  He has been through physical therapy which eventually made him worse.  Discussed HEP as prescribed by PT at his own pace to avoid facet inflammation.   Continue as needed ibuprofen with food.  Risks, benefits, and alternatives to MBB/RFA reviewed with patient.  He wishes to proceed.  Offered referral to pain management for chronic opioids.          HISTORY OF PRESENT ILLNESS      Silvestre Partida is a 63 y.o. male presents for follow up of lumbar pain. At his last OV (2023), an MRI was ordered for further evaluation and possible surgery. The patient received a referral for spine surgery and started trial of Baclofen PRN.    Patient was referred by Dr. Vazquez for RFA after surgical consultation.     Patient presents with persistent back pain and occasional bilateral pain in buttocks. He denies any sciatic pain.     The patient has attended PT in the past month to which he experienced relief after the first couple visits; however, the last few visits exacerbated his pain. He admits to taking Ibuprofen for pain PRN with relief.    Of note, the patient is not currently followed by pain management.  Reports benefit with Vicodin in the past.           2024     3:44 PM   AMB PAIN ASSESSMENT   Location of Pain Back   Severity of Pain 4   Quality of Pain Sharp;Aching   Duration of Pain Persistent

## 2024-02-27 ENCOUNTER — HOSPITAL ENCOUNTER (OUTPATIENT)
Facility: HOSPITAL | Age: 64
Discharge: HOME | End: 2024-03-01
Payer: COMMERCIAL

## 2024-02-27 VITALS
OXYGEN SATURATION: 98 % | RESPIRATION RATE: 16 BRPM | HEART RATE: 75 BPM | SYSTOLIC BLOOD PRESSURE: 155 MMHG | TEMPERATURE: 98.2 F | DIASTOLIC BLOOD PRESSURE: 86 MMHG

## 2024-02-27 PROCEDURE — 6360000002 HC RX W HCPCS: Performed by: PHYSICAL MEDICINE & REHABILITATION

## 2024-02-27 PROCEDURE — 64493 INJ PARAVERT F JNT L/S 1 LEV: CPT

## 2024-02-27 PROCEDURE — 64494 INJ PARAVERT F JNT L/S 2 LEV: CPT | Performed by: PHYSICAL MEDICINE & REHABILITATION

## 2024-02-27 PROCEDURE — 64494 INJ PARAVERT F JNT L/S 2 LEV: CPT

## 2024-02-27 PROCEDURE — 64493 INJ PARAVERT F JNT L/S 1 LEV: CPT | Performed by: PHYSICAL MEDICINE & REHABILITATION

## 2024-02-27 RX ORDER — ROPIVACAINE HYDROCHLORIDE 2 MG/ML
4-8 INJECTION, SOLUTION EPIDURAL; INFILTRATION; PERINEURAL ONCE
Status: COMPLETED | OUTPATIENT
Start: 2024-02-27 | End: 2024-02-27

## 2024-02-27 RX ADMIN — ROPIVACAINE HYDROCHLORIDE 1 ML: 2 INJECTION EPIDURAL; INFILTRATION; PERINEURAL at 14:21

## 2024-02-27 ASSESSMENT — PAIN - FUNCTIONAL ASSESSMENT: PAIN_FUNCTIONAL_ASSESSMENT: 0-10

## 2024-02-27 ASSESSMENT — PAIN SCALES - GENERAL: PAINLEVEL_OUTOF10: 7

## 2024-02-27 NOTE — INTERVAL H&P NOTE
Update History & Physical    The patient's History and Physical of January 31, 2024 was reviewed with the patient. There was no change. The surgical site was confirmed by the patient and me.     Plan: The risks, benefits, expected outcome, and alternative to the recommended procedure have been discussed with the patient. Patient understands and wants to proceed with the procedure.     Electronically signed by GLADYS REDDING MD on 2/27/2024 at 2:12 PM

## 2024-02-27 NOTE — PROCEDURES
DIAGNOTIC LUMBAR MEDIAL BRANCH BLOCKS  PROCEDURE REPORT      PATIENT:  Silvestre Partida  YOB: 1960  DATE OF SERVICE:  2/27/2024  SITE:  [facility]:  [place] [address]    PRE-PROCEDURE DIAGNOSIS:  Lumbar Facet Arthopathy    POST-PROCEDURE DIAGNOSIS:  Same                PROCEDURE:    bilateral   diagnostic lumbar medial branch blocks via L4, L5, and L5 dorsal ramus block for suspected facet mediated pain from L4/5 and L5/S1.      PRE-PROCEDURE NOTE:  The details of the procedure have been discussed with the patient, including the risks, benefits and alternative options and an informed consent was obtained. The availability of a responsible adult to escort the patient following the procedure were confirmed.    PROCEDURE NOTE:  The patient was brought to the procedure suite and positioned on the fluoroscopy table in the prone position. Physiologic monitors were applied. The skin was prepped in the standard surgical fashion and sterile drapes were applied over the procedure site.     Under AP fluoroscopic guidance a 25-gauge, 3-1/2 inch short bevel spinal needle was advanced to the junction of the superior articular process and transverse process of L4 and L5.   A needle was also placed along the sacral ala to block the L5 dorsal ramus.   After negative vascular aspiration at each site, then  0.5 mL of 0.2% ropivacaine was injected at each location.      The procedure was performed on the contralateral side in the same fashion.    The needles were removed intact.  The area was thoroughly cleaned and sterile bandages applied as necessary. The patient tolerated the procedure well.    Patient will be called to assess the degree of pain relief and the ability to do functional activities.  Patient reported isaiah-procedural pain on Visual Analog Scale:  pre-4; post-7.Patient had lumbar muscle spasms during procedure. Post-procedure pain at site of injections and

## 2024-02-28 ENCOUNTER — CLINICAL DOCUMENTATION (OUTPATIENT)
Age: 64
End: 2024-02-28

## 2024-02-28 ENCOUNTER — TELEPHONE (OUTPATIENT)
Age: 64
End: 2024-02-28

## 2024-02-28 NOTE — TELEPHONE ENCOUNTER
Patient called to advised he is going into a meeting, he \"advised he is doing great, no discomfort and the shot worked\".

## 2024-02-28 NOTE — PROGRESS NOTES
GUTIERREZ GARZA 1960    Trial #1 2024  Medial Branch Block Bilateral L4/5, L5/S1    2024 Pre-Procedure Pain Level: 9  2024 Post-Procedure Pain Level 0   The percent of pain relief 100%  Patient stated he felt 100% relief from this procedure.    Activities Performed: Activities Performed: standing, increased walking, lifing, and bending    Trial #2  2024 Pre-Procedure Pain Level: 6   ost Procedure Pain Level 1   The Percent of pain relief 83%  Patient stated he had 90% pain relief from this procedure    Activities Performed: standing, increased walking, lifing, and bending, went up 4 flights of stairs, was able to sneeze without pain.    Follow Up Appointment   Patient has appointment with you on 4/10/2024    RFA, patient wishes to proceed

## 2024-03-12 ENCOUNTER — HOSPITAL ENCOUNTER (OUTPATIENT)
Facility: HOSPITAL | Age: 64
Discharge: HOME | End: 2024-03-15
Payer: COMMERCIAL

## 2024-03-12 VITALS
TEMPERATURE: 98.5 F | RESPIRATION RATE: 16 BRPM | HEART RATE: 79 BPM | SYSTOLIC BLOOD PRESSURE: 157 MMHG | DIASTOLIC BLOOD PRESSURE: 85 MMHG | OXYGEN SATURATION: 98 %

## 2024-03-12 PROCEDURE — 64494 INJ PARAVERT F JNT L/S 2 LEV: CPT

## 2024-03-12 PROCEDURE — 64493 INJ PARAVERT F JNT L/S 1 LEV: CPT | Performed by: PHYSICAL MEDICINE & REHABILITATION

## 2024-03-12 PROCEDURE — 64493 INJ PARAVERT F JNT L/S 1 LEV: CPT

## 2024-03-12 PROCEDURE — 6360000002 HC RX W HCPCS: Performed by: PHYSICAL MEDICINE & REHABILITATION

## 2024-03-12 PROCEDURE — 64494 INJ PARAVERT F JNT L/S 2 LEV: CPT | Performed by: PHYSICAL MEDICINE & REHABILITATION

## 2024-03-12 RX ORDER — ROPIVACAINE HYDROCHLORIDE 2 MG/ML
10 INJECTION, SOLUTION EPIDURAL; INFILTRATION; PERINEURAL ONCE
Status: COMPLETED | OUTPATIENT
Start: 2024-03-12 | End: 2024-03-12

## 2024-03-12 RX ADMIN — ROPIVACAINE HYDROCHLORIDE 4 ML: 2 INJECTION EPIDURAL; INFILTRATION; PERINEURAL at 13:41

## 2024-03-12 ASSESSMENT — PAIN SCALES - GENERAL: PAINLEVEL_OUTOF10: 6

## 2024-03-12 ASSESSMENT — PAIN - FUNCTIONAL ASSESSMENT: PAIN_FUNCTIONAL_ASSESSMENT: 0-10

## 2024-03-12 ASSESSMENT — PAIN DESCRIPTION - DESCRIPTORS: DESCRIPTORS: ACHING;SHARP;STABBING

## 2024-03-12 NOTE — PROCEDURES
DIAGNOTIC LUMBAR MEDIAL BRANCH BLOCKS  PROCEDURE REPORT, TRIAL #2      PATIENT:  Silvestre Partida  YOB: 1960  DATE OF SERVICE:  3/12/2024  SITE:  [facility]:  [place] [address]    PRE-PROCEDURE DIAGNOSIS:  Lumbar Facet Arthopathy    POST-PROCEDURE DIAGNOSIS:  Same                PROCEDURE:    bilateral   diagnostic lumbar medial branch blocks via L4, L5, and L5 dorsal ramus block for suspected facet mediated pain from L4/5 and L5/S1.      PRE-PROCEDURE NOTE:  The details of the procedure have been discussed with the patient, including the risks, benefits and alternative options and an informed consent was obtained. The availability of a responsible adult to escort the patient following the procedure were confirmed.    PROCEDURE NOTE:  The patient was brought to the procedure suite and positioned on the fluoroscopy table in the prone position. Physiologic monitors were applied. The skin was prepped in the standard surgical fashion and sterile drapes were applied over the procedure site.     Under AP fluoroscopic guidance a 25-gauge, 3-1/2 inch short bevel spinal needle was advanced to the junction of the superior articular process and transverse process of L4 and L5.   A needle was also placed along the sacral ala to block the L5 dorsal ramus.   After negative vascular aspiration at each site, then  0.5 mL of 0.2% ropivacaine was injected at each location.      The procedure was performed on the contralateral side in the same fashion.    The needles were removed intact.  The area was thoroughly cleaned and sterile bandages applied as necessary. The patient tolerated the procedure well.    Patient will be called to assess the degree of pain relief and the ability to do functional activities.  Patient reported isaiah-procedural pain on Visual Analog Scale:  pre-9; post-6.      GLADYS REDDING MD 3/12/2024 1:35 PM

## 2024-03-12 NOTE — H&P
Messages    Read Composed From To  Subject   N 2/24/2024  6:53 AM MD Helga Albarran Thomas Shawn  Upcoming appointment at [facility] on 2/27/24     Orders Placed    Blood glucose - POCT ONE TIME    NC XR TECHNOLOGIST SERVICE 1 TIME IMAGING  Initiate PAT Protocol  Outpatient Medications at End of Encounter as of 2/27/2024    vitamin D (CHOLECALCIFEROL) 25 MCG (1000 UT) TABS tablet Take 1 tablet by mouth daily   fluticasone (FLONASE) 50 MCG/ACT nasal spray 2 sprays by Nasal route daily as needed   levothyroxine (SYNTHROID) 125 MCG tablet ceived the following from [facility]: Outside name: Synthroid 125 mcg tablet   triamcinolone (KENALOG) 0.1 % cream Apply topically daily as needed     Medication Changes      None  Medication List  Medications Administered    ROPivacaine HCl 1 mL  Visit Diagnoses      None  Problem List

## 2024-04-10 ENCOUNTER — OFFICE VISIT (OUTPATIENT)
Age: 64
End: 2024-04-10
Payer: COMMERCIAL

## 2024-04-10 VITALS
HEIGHT: 74 IN | OXYGEN SATURATION: 98 % | TEMPERATURE: 97.1 F | WEIGHT: 225 LBS | SYSTOLIC BLOOD PRESSURE: 128 MMHG | HEART RATE: 73 BPM | DIASTOLIC BLOOD PRESSURE: 81 MMHG | BODY MASS INDEX: 28.88 KG/M2

## 2024-04-10 DIAGNOSIS — M47.816 LUMBAR FACET ARTHROPATHY: Primary | ICD-10-CM

## 2024-04-10 PROCEDURE — 1123F ACP DISCUSS/DSCN MKR DOCD: CPT | Performed by: PHYSICAL MEDICINE & REHABILITATION

## 2024-04-10 PROCEDURE — 99213 OFFICE O/P EST LOW 20 MIN: CPT | Performed by: PHYSICAL MEDICINE & REHABILITATION

## 2024-04-10 RX ORDER — IBUPROFEN 600 MG/1
600 TABLET ORAL EVERY 8 HOURS PRN
COMMUNITY
Start: 2024-03-18

## 2024-04-10 NOTE — PATIENT INSTRUCTIONS
nerve may be treated.  How long do medial branch block and neurotomy take?  It takes 20 to 30 minutes to get the block. You can go home after the doctor watches you for about an hour.  It takes 45 to 90 minutes to get a neurotomy, depending on how many nerves are heated. You will probably go home 30 to 60 minutes after the procedure.  What can you expect after a neurotomy?  You will get instructions on how to report how much pain you have when you are at home.  You may feel a little sore or tender at the injection site at first. But after a successful neurotomy, most people have pain relief right away. It often lasts for several months, but your pain may come back.  If your pain does come back, it may mean that the damaged nerve has healed and can send pain messages again. Or it can mean that a different nerve is causing pain. Your doctor will discuss your options with you.  Follow-up care is a key part of your treatment and safety. Be sure to make and go to all appointments, and call your doctor if you are having problems. It's also a good idea to know your test results and keep a list of the medicines you take.  Current as of: February 23, 2022               Content Version: 13.5  © 2006-2022 [employer].   Care instructions adapted under license by "[employer]. If you have questions about a medical condition or this instruction, always ask your healthcare professional. [employer] disclaims any warranty or liability for your use of this information.

## 2024-04-10 NOTE — PROGRESS NOTES
[facility]    [address] [place] [address], [address]  [place] [address]  Phone: [phone]  Fax: [phone]        Silvestre Partida  : 1960  PCP: None, None    PROGRESS NOTE      ASSESSMENT AND PLAN    Silvestre was seen today for lower back pain.    Diagnoses and all orders for this visit:    Lumbar facet arthropathy  -     Ambulatory Referral to Ortho Injection        Silvestre Partida is a 63 y.o. male with chronic axial low back pain, failed conservative measures.  Excellent response to lumbar medial branch blocks..   Discussed RFA benefits, risks, and alternatives. Patient wishes to proceed.  Patient given information on RF procedure.  Schedule RFA L4/5 and L5/S1. Left side first, right side 2 weeks later.          HISTORY OF PRESENT ILLNESS    Silvestre Partida is a 63 y.o. male presents for follow up of lumbar pain. At his last OV (2024), continue Ibuprofen PRN and referred patient to pain management. Scheduled patient for MBB/RFA.    Patient underwent L4/5 and L5/S1 MBB x2 (2024, 2024). He reports 90% and 100% benefit with the injections, respectively. Patient explains that he was able to perform his yard work without increased pain.    Patient reports persistent back pain (L>R). He explains his pain returned after a couple days.    Patient denies any major medical problems, hospitalizations, or surgeries since last OV.         4/10/2024     2:33 PM   AMB PAIN ASSESSMENT   Location of Pain Back   Severity of Pain 5   Quality of Pain Aching   Duration of Pain Persistent   Frequency of Pain Constant   Limiting Behavior Yes   Relieving Factors Rest   Result of Injury No   Work-Related Injury No   Are there other pain locations you wish to document? No         Investigations:   L MRI (2023): L3/L4/L5 mild to moderate stenosis, multilevel FA with inflammation  L XR AP/lat/ext/flex: DDD L5-S1. Facet changes L3-sacrum. No instability. Small anterior osteophytes

## 2024-04-30 ENCOUNTER — HOSPITAL ENCOUNTER (OUTPATIENT)
Facility: HOSPITAL | Age: 64
Discharge: HOME | End: 2024-05-03
Payer: COMMERCIAL

## 2024-04-30 VITALS
OXYGEN SATURATION: 98 % | HEART RATE: 69 BPM | SYSTOLIC BLOOD PRESSURE: 120 MMHG | RESPIRATION RATE: 17 BRPM | DIASTOLIC BLOOD PRESSURE: 76 MMHG | TEMPERATURE: 98.4 F

## 2024-04-30 PROCEDURE — 6370000000 HC RX 637 (ALT 250 FOR IP): Performed by: PHYSICAL MEDICINE & REHABILITATION

## 2024-04-30 PROCEDURE — 64636 DESTROY L/S FACET JNT ADDL: CPT

## 2024-04-30 PROCEDURE — 2500000003 HC RX 250 WO HCPCS: Performed by: PHYSICAL MEDICINE & REHABILITATION

## 2024-04-30 PROCEDURE — 64635 DESTROY LUMB/SAC FACET JNT: CPT | Performed by: PHYSICAL MEDICINE & REHABILITATION

## 2024-04-30 PROCEDURE — 64635 DESTROY LUMB/SAC FACET JNT: CPT

## 2024-04-30 PROCEDURE — 64636 DESTROY L/S FACET JNT ADDL: CPT | Performed by: PHYSICAL MEDICINE & REHABILITATION

## 2024-04-30 PROCEDURE — 2720000010 HC SURG SUPPLY STERILE

## 2024-04-30 PROCEDURE — 6360000002 HC RX W HCPCS: Performed by: PHYSICAL MEDICINE & REHABILITATION

## 2024-04-30 RX ORDER — DIAZEPAM 5 MG/1
5 TABLET ORAL ONCE
Status: COMPLETED | OUTPATIENT
Start: 2024-04-30 | End: 2024-04-30

## 2024-04-30 RX ORDER — LIDOCAINE HYDROCHLORIDE 10 MG/ML
30 INJECTION, SOLUTION EPIDURAL; INFILTRATION; INTRACAUDAL; PERINEURAL ONCE
Status: COMPLETED | OUTPATIENT
Start: 2024-04-30 | End: 2024-04-30

## 2024-04-30 RX ORDER — DEXAMETHASONE SODIUM PHOSPHATE 10 MG/ML
10 INJECTION, SOLUTION INTRAMUSCULAR; INTRAVENOUS ONCE
Status: COMPLETED | OUTPATIENT
Start: 2024-04-30 | End: 2024-04-30

## 2024-04-30 RX ORDER — DIAZEPAM 5 MG/1
2.5 TABLET ORAL ONCE
Status: COMPLETED | OUTPATIENT
Start: 2024-04-30 | End: 2024-04-30

## 2024-04-30 RX ORDER — LIDOCAINE HYDROCHLORIDE 20 MG/ML
5 INJECTION, SOLUTION EPIDURAL; INFILTRATION; INTRACAUDAL; PERINEURAL ONCE
Status: COMPLETED | OUTPATIENT
Start: 2024-04-30 | End: 2024-04-30

## 2024-04-30 RX ORDER — DIAZEPAM 5 MG/1
10 TABLET ORAL ONCE
Status: COMPLETED | OUTPATIENT
Start: 2024-04-30 | End: 2024-04-30

## 2024-04-30 RX ORDER — FEXOFENADINE HCL 180 MG/1
180 TABLET ORAL DAILY
COMMUNITY

## 2024-04-30 RX ADMIN — DIAZEPAM 10 MG: 5 TABLET ORAL at 13:34

## 2024-04-30 RX ADMIN — DEXAMETHASONE SODIUM PHOSPHATE 10 MG: 10 INJECTION INTRAMUSCULAR; INTRAVENOUS at 14:41

## 2024-04-30 RX ADMIN — LIDOCAINE HYDROCHLORIDE 15 ML: 10 INJECTION, SOLUTION EPIDURAL; INFILTRATION; INTRACAUDAL; PERINEURAL at 14:23

## 2024-04-30 RX ADMIN — LIDOCAINE HYDROCHLORIDE 5 ML: 20 INJECTION, SOLUTION EPIDURAL; INFILTRATION; INTRACAUDAL; PERINEURAL at 14:35

## 2024-04-30 ASSESSMENT — PAIN - FUNCTIONAL ASSESSMENT: PAIN_FUNCTIONAL_ASSESSMENT: 0-10

## 2024-04-30 ASSESSMENT — PAIN DESCRIPTION - DESCRIPTORS: DESCRIPTORS: ACHING;SHARP

## 2024-04-30 ASSESSMENT — PAIN SCALES - GENERAL: PAINLEVEL_OUTOF10: 8

## 2024-04-30 NOTE — PROCEDURES
LUMBAR RADIOFREQUENCY THERMOCOAGULATION   PROCEDURE REPORT      PATIENT:  Silvestre Partida  YOB: 1960  DATE OF SERVICE:  4/30/2024  SITE:  [facility], [place], [place]    PRE-PROCEDURE DIAGNOSIS:  Lumbar Facet Arthropathy, Lumbar Spondylosis  POST-PROCEDURE DIAGNOSIS:  Same  PROCEDURE: left radiofrequency thermocoagulation of lumbar medial branch nerves at L4,  L5 and the L5 dorsal ramus  for treatment of L4/5 and L5/S1  presumed lumbar facet joint mediated pain using the [employer] system    ANESTHESIA:   Local with or with out oral sedation.  See Medication Administration Record for specific medications and dosage.    PHYSICIAN:  Lara Vasquez MD    PRE-PROCEDURE NOTE:  Pre-procedural assessment of the patient was performed.  The patient has had greater than 80% improvement of pain score and functional abilities with medial branch blocks and is considered an appropriate candidate for RFA.  A full description of the procedure was provided including the risks, benefits, possible complications, and alternative options. Informed consent was given and signed. The availability of a responsible adult to escort the patient following the procedure was confirmed.    PROCEDURE NOTE:  The patient was brought to the fluoroscopy suite and positioned on the fluoroscopy table in the prone position. A grounding pad was applied to the lower extremity. Physiologic monitors were applied. The lumbar skin  was widely prepped, allowed to air dry, and draped in standard sterile surgical fashion. 1% Lidocaine was utilized via 25g needle for local anesthesia Please refer to the Flowsheet for documentation of the patient’s medications and vital signs..    Under ipsilateral oblique fluoroscopic guidance a  17gauge 100mm radiofrequency introducer needle was placed and slowly advanced. The planned anatomic targets were approached in a perpendicular fashion to  the junction of the superior articular

## 2024-05-14 ENCOUNTER — HOSPITAL ENCOUNTER (OUTPATIENT)
Facility: HOSPITAL | Age: 64
Discharge: HOME | End: 2024-05-17
Payer: COMMERCIAL

## 2024-05-14 VITALS
OXYGEN SATURATION: 98 % | RESPIRATION RATE: 17 BRPM | DIASTOLIC BLOOD PRESSURE: 80 MMHG | SYSTOLIC BLOOD PRESSURE: 119 MMHG | TEMPERATURE: 97.4 F | HEART RATE: 69 BPM

## 2024-05-14 PROCEDURE — 64635 DESTROY LUMB/SAC FACET JNT: CPT

## 2024-05-14 PROCEDURE — 6360000002 HC RX W HCPCS: Performed by: PHYSICAL MEDICINE & REHABILITATION

## 2024-05-14 PROCEDURE — 64635 DESTROY LUMB/SAC FACET JNT: CPT | Performed by: PHYSICAL MEDICINE & REHABILITATION

## 2024-05-14 PROCEDURE — 2500000003 HC RX 250 WO HCPCS: Performed by: PHYSICAL MEDICINE & REHABILITATION

## 2024-05-14 PROCEDURE — 6370000000 HC RX 637 (ALT 250 FOR IP): Performed by: PHYSICAL MEDICINE & REHABILITATION

## 2024-05-14 PROCEDURE — 64636 DESTROY L/S FACET JNT ADDL: CPT | Performed by: PHYSICAL MEDICINE & REHABILITATION

## 2024-05-14 PROCEDURE — 2720000010 HC SURG SUPPLY STERILE

## 2024-05-14 PROCEDURE — 64636 DESTROY L/S FACET JNT ADDL: CPT

## 2024-05-14 RX ORDER — DIAZEPAM 5 MG/1
2.5 TABLET ORAL ONCE
Status: COMPLETED | OUTPATIENT
Start: 2024-05-14 | End: 2024-05-14

## 2024-05-14 RX ORDER — DIAZEPAM 5 MG/1
10 TABLET ORAL ONCE
Status: COMPLETED | OUTPATIENT
Start: 2024-05-14 | End: 2024-05-14

## 2024-05-14 RX ORDER — DEXAMETHASONE SODIUM PHOSPHATE 10 MG/ML
10 INJECTION, SOLUTION INTRAMUSCULAR; INTRAVENOUS ONCE
Status: COMPLETED | OUTPATIENT
Start: 2024-05-14 | End: 2024-05-14

## 2024-05-14 RX ORDER — DIAZEPAM 5 MG/1
5 TABLET ORAL ONCE
Status: COMPLETED | OUTPATIENT
Start: 2024-05-14 | End: 2024-05-14

## 2024-05-14 RX ORDER — LIDOCAINE HYDROCHLORIDE 10 MG/ML
30 INJECTION, SOLUTION EPIDURAL; INFILTRATION; INTRACAUDAL; PERINEURAL ONCE
Status: COMPLETED | OUTPATIENT
Start: 2024-05-14 | End: 2024-05-14

## 2024-05-14 RX ORDER — LIDOCAINE HYDROCHLORIDE 20 MG/ML
5 INJECTION, SOLUTION EPIDURAL; INFILTRATION; INTRACAUDAL; PERINEURAL ONCE
Status: COMPLETED | OUTPATIENT
Start: 2024-05-14 | End: 2024-05-14

## 2024-05-14 RX ADMIN — DEXAMETHASONE SODIUM PHOSPHATE 10 MG: 10 INJECTION, SOLUTION INTRAMUSCULAR; INTRAVENOUS at 14:45

## 2024-05-14 RX ADMIN — DIAZEPAM 10 MG: 5 TABLET ORAL at 13:24

## 2024-05-14 RX ADMIN — LIDOCAINE HYDROCHLORIDE 20 ML: 10 INJECTION, SOLUTION EPIDURAL; INFILTRATION; INTRACAUDAL; PERINEURAL at 14:21

## 2024-05-14 RX ADMIN — LIDOCAINE HYDROCHLORIDE 5 ML: 20 INJECTION, SOLUTION EPIDURAL; INFILTRATION; INTRACAUDAL; PERINEURAL at 14:38

## 2024-05-14 ASSESSMENT — PAIN DESCRIPTION - DESCRIPTORS: DESCRIPTORS: ACHING;SHOOTING

## 2024-05-14 ASSESSMENT — PAIN - FUNCTIONAL ASSESSMENT: PAIN_FUNCTIONAL_ASSESSMENT: 0-10

## 2024-05-14 ASSESSMENT — PAIN SCALES - GENERAL: PAINLEVEL_OUTOF10: 6

## 2024-05-14 NOTE — INTERVAL H&P NOTE
Update History & Physical    The patient's History and Physical of April 10, 2024 was reviewed. Left RFA 2 weeks ago, here for right side. There was no change. The surgical site was confirmed by the patient and me.     Plan: The risks, benefits, expected outcome, and alternative to the recommended procedure have been discussed with the patient. Patient understands and wants to proceed with the procedure.     Electronically signed by GLADYS REDDING MD on 5/14/2024 at 2:14 PM

## 2024-05-14 NOTE — H&P
Office Visit  4/10/2024  [facility]       Lara Vasquez MD  Physical Medicine and Rehab Lumbar facet arthropathy  Dx Lower Back Pain  Reason for Visit     Progress Notes  Lara Vasquez MD (Physician)  Physical Medicine and Rehab         [facility]    [address], [address]  [place] [address]  Phone: [phone]  Fax: [phone]           Silvestre Partida  : 1960  PCP: None, None     PROGRESS NOTE        ASSESSMENT AND PLAN     Silvestre was seen today for lower back pain.     Diagnoses and all orders for this visit:     Lumbar facet arthropathy  -     Ambulatory Referral to Ortho Injection           Silvestre Partiad is a 63 y.o. male with chronic axial low back pain, failed conservative measures.  Excellent response to lumbar medial branch blocks..   Discussed RFA benefits, risks, and alternatives. Patient wishes to proceed.  Patient given information on RF procedure.  Schedule RFA L4/5 and L5/S1. Left side first, right side 2 weeks later.             HISTORY OF PRESENT ILLNESS     Silvestre Partida is a 63 y.o. male presents for follow up of lumbar pain. At his last OV (2024), continue Ibuprofen PRN and referred patient to pain management. Scheduled patient for MBB/RFA.     Patient underwent L4/5 and L5/S1 MBB x2 (2024, 2024). He reports 90% and 100% benefit with the injections, respectively. Patient explains that he was able to perform his yard work without increased pain.     Patient reports persistent back pain (L>R). He explains his pain returned after a couple days.     Patient denies any major medical problems, hospitalizations, or surgeries since last OV.           4/10/2024     2:33 PM   AMB PAIN ASSESSMENT   Location of Pain Back   Severity of Pain 5   Quality of Pain Aching   Duration of Pain Persistent   Frequency of Pain Constant   Limiting Behavior Yes   Relieving Factors Rest   Result of Injury No

## 2024-05-14 NOTE — PROCEDURES
processes and the transverse processes of  right L4 and L5 . For the L5 dorsal ramus, the needle was placed at  the S1 superior articular process at the base of the sacral ala. Needle tip position was verified with additional lateral and AP views.     After each individual needle was placed and stylets removed, a radiofrequency probe with a 4mm active tip was inserted.   At each site,  motor testing at 2 Hz to a maximum of 2 volts was performed.The patient was awake and responsive during this portion of the procedure. Patient denied any complaints into the buttocks/leg.  There was no evidence of motor stimulation in the ipsilateral gluteal muscles or extremity.        After the negative aspiration of blood, air or CSF, each target was then anesthetized with 1-2 mL of lidocaine 2% . Each target was lesioned at 80 degrees Celsius for a total cycle of 2 minutes and 30 seconds.  Tissue impedence remained below 500 Ohms.  A mixture of 2mL lidocaine 1%  with dexamethasone 10mg [10mg/ml] was then injected through each radiofrequency needle .  All needles and electrodes were removed intact. The area was thoroughly cleaned and sterile bandages applied as necessary. Fluoroscopic images were digitally archived.The patient tolerated the procedure well without complication and the vital signs remained stable throughout the procedure.    POST-PROCEDURE COURSE :  The patient was escorted from the procedure suite in satisfactory condition.  The patient did not experience any adverse events and remained hemodynamically stable during the post-procedure period.  Patient was observed for at least 10 minutes post-procedure. No increase in back or leg symptoms. Dressing clean, dry, and intact. LE strength intact. Appropriate post-procedure instructions were provided  The patient is aware that their pain may flare and that 4-6 weeks may be required prior to the onset of pain relief.Patient reported isaiah-procedural pain on Visual Analog

## 2024-06-19 ENCOUNTER — OFFICE VISIT (OUTPATIENT)
Age: 64
End: 2024-06-19
Payer: COMMERCIAL

## 2024-06-19 VITALS
BODY MASS INDEX: 29 KG/M2 | HEART RATE: 71 BPM | HEIGHT: 74 IN | DIASTOLIC BLOOD PRESSURE: 84 MMHG | TEMPERATURE: 98.6 F | RESPIRATION RATE: 18 BRPM | SYSTOLIC BLOOD PRESSURE: 139 MMHG | WEIGHT: 226 LBS | OXYGEN SATURATION: 100 %

## 2024-06-19 DIAGNOSIS — M47.816 LUMBAR FACET ARTHROPATHY: Primary | ICD-10-CM

## 2024-06-19 PROCEDURE — 1123F ACP DISCUSS/DSCN MKR DOCD: CPT | Performed by: PHYSICAL MEDICINE & REHABILITATION

## 2024-06-19 PROCEDURE — 99213 OFFICE O/P EST LOW 20 MIN: CPT | Performed by: PHYSICAL MEDICINE & REHABILITATION

## 2024-06-19 NOTE — PROGRESS NOTES
Silvestre Partida presents today for   Chief Complaint   Patient presents with    Back Problem    Pain    Back Pain       Is someone accompanying this pt? no    Is the patient using any DME equipment during OV? no    Depression Screening:       No data to display                Learning Assessment:  Failed to redirect to the Timeline version of the [employer] SmartLink.    Abuse Screening:       No data to display                Fall Risk  Failed to redirect to the Timeline version of the [employer] SmartLink.    OPIOID RISK TOOL  Failed to redirect to the Timeline version of the [employer] SmartLink.    Coordination of Care:  1. Have you been to the ER, urgent care clinic since your last visit? no  Hospitalized since your last visit? no    2. Have you seen or consulted any other health care providers outside of the [facility] since your last visit? no Include any pap smears or colon screening. no      
Behavior Yes   Relieving Factors Ice   Result of Injury No         Investigations:   L MRI (11/2023): L3/L4/L5 mild to moderate stenosis, multilevel FA with inflammation  L XR AP/lat/ext/flex: DDD L5-S1. Facet changes L3-sacrum. No instability. Small anterior osteophytes at L5.  L MRI 2/2022: progression of DDD, epidural lipomatosis. Mild stenosis L2/3/4/5, moderate FA with inflammation. My read: worst level  stenosis L3-4  L MRI 2015: disc protrusion with FA L4/5/S1, mild stenosis     Spine surgery consult: Dr. Vazquez (12/2023), try RFA.  Surgery would be multi-level fusion, uncertain benefit       Treatments:  Physical therapy: Yes (01/2024) helped initially, but then increased pain   Spinal injections: L4/5 and L5/S1 RFA x2 (04-05/2024) with 75% benefit, L4/5 and L5/S1 MBB x2 (02-03/2024) with significant benefit. JOSE L5-S1 3/2022 - made his pain worse B/L L5-S1 facet injections 2017  with benefit   Spinal surgery- no   Beneficial medications: Ibuprofen, Norco  (PCP weaning),   Failed medications: Cymbalta - mood swings, Gabapentin - ineffective, somnolence,  Lyrica - ineffective       Work Status: shipyard as a [employer]   RHD. Pertinent PMHx:  Thyroid disease, rotator  cuff repair B/L.         PHYSICAL EXAMINATION    /84 (Site: Left Upper Arm, Position: Sitting, Cuff Size: Medium Adult)   Pulse 71   Temp 98.6 °F (37 °C) (Oral)   Resp 18   Ht 1.88 m (6' 2\")   Wt 102.5 kg (226 lb)   SpO2 100% Comment: RA.  BMI 29.02 kg/m²     Ambulatory without assistive device, FWB gait     TTP: Non-tender to palpation.   Lumbar flexion: fingertips to shin, minimal pain with extension  LE strength: Intact  SLR: Negative  No peripheral edema   Skin is intact  Hamstring tightness bilaterally      By signing my name below, VIGNESH Connolly, attest that this documentation has been prepared under the direction and in the presence of Lara Vasquez MD.  Electronically signed: VIGNESH Mckay. 6/19/2024 10:39 PM EDT    I

## (undated) DEVICE — BANDAGE ADH W0.75XL3IN UNIV WVN FAB NAT GEN USE STRP N ADH

## (undated) DEVICE — Device: Brand: MEDEX

## (undated) DEVICE — MEDIA CONTRAST 10ML 200MG/ML 41%

## (undated) DEVICE — (D)BNDG ADHESIVE FABRIC 3/4X3 -- DISC BY MFR USE ITEM 357960

## (undated) DEVICE — SET EPI 18GA L3.5IN TUOHY NDL W/ 20GA CLS TIP NYL CATH

## (undated) DEVICE — TRAY MYEL SFTY +

## (undated) DEVICE — SYR 10ML LUER LOK 1/5ML GRAD --

## (undated) DEVICE — (D)SYR 10ML 1/5ML GRAD NSAF -- PKGING CHANGE USE ITEM 338027